# Patient Record
Sex: MALE | Employment: FULL TIME | URBAN - METROPOLITAN AREA
[De-identification: names, ages, dates, MRNs, and addresses within clinical notes are randomized per-mention and may not be internally consistent; named-entity substitution may affect disease eponyms.]

---

## 2018-01-12 ENCOUNTER — HOSPITAL ENCOUNTER (EMERGENCY)
Facility: CLINIC | Age: 28
Discharge: HOME | End: 2018-01-12
Attending: FAMILY MEDICINE | Admitting: FAMILY MEDICINE
Payer: OTHER MISCELLANEOUS

## 2018-01-12 VITALS
OXYGEN SATURATION: 98 % | TEMPERATURE: 98.6 F | HEIGHT: 72 IN | HEART RATE: 85 BPM | DIASTOLIC BLOOD PRESSURE: 98 MMHG | WEIGHT: 171.6 LBS | BODY MASS INDEX: 23.24 KG/M2 | SYSTOLIC BLOOD PRESSURE: 143 MMHG | RESPIRATION RATE: 16 BRPM

## 2018-01-12 DIAGNOSIS — S61.412A LACERATION OF LEFT HAND WITHOUT FOREIGN BODY, INITIAL ENCOUNTER: ICD-10-CM

## 2018-01-12 PROCEDURE — 12001 RPR S/N/AX/GEN/TRNK 2.5CM/<: CPT | Performed by: FAMILY MEDICINE

## 2018-01-12 PROCEDURE — 99282 EMERGENCY DEPT VISIT SF MDM: CPT | Mod: 25 | Performed by: FAMILY MEDICINE

## 2018-01-12 PROCEDURE — 90471 IMMUNIZATION ADMIN: CPT | Performed by: FAMILY MEDICINE

## 2018-01-12 PROCEDURE — 12001 RPR S/N/AX/GEN/TRNK 2.5CM/<: CPT | Mod: Z6 | Performed by: FAMILY MEDICINE

## 2018-01-12 PROCEDURE — 25000125 ZZHC RX 250: Performed by: FAMILY MEDICINE

## 2018-01-12 PROCEDURE — 90715 TDAP VACCINE 7 YRS/> IM: CPT | Performed by: FAMILY MEDICINE

## 2018-01-12 PROCEDURE — 25000128 H RX IP 250 OP 636: Performed by: FAMILY MEDICINE

## 2018-01-12 PROCEDURE — 99283 EMERGENCY DEPT VISIT LOW MDM: CPT | Mod: 25 | Performed by: FAMILY MEDICINE

## 2018-01-12 RX ORDER — BUPIVACAINE HYDROCHLORIDE 5 MG/ML
30 INJECTION, SOLUTION EPIDURAL; INTRACAUDAL ONCE
Status: COMPLETED | OUTPATIENT
Start: 2018-01-12 | End: 2018-01-12

## 2018-01-12 RX ORDER — IBUPROFEN 200 MG
600 TABLET ORAL EVERY 6 HOURS PRN
Qty: 30 TABLET | Refills: 0 | Status: SHIPPED | OUTPATIENT
Start: 2018-01-12 | End: 2018-09-07

## 2018-01-12 RX ADMIN — BUPIVACAINE HYDROCHLORIDE 150 MG: 5 INJECTION, SOLUTION EPIDURAL; INTRACAUDAL at 22:57

## 2018-01-12 RX ADMIN — CLOSTRIDIUM TETANI TOXOID ANTIGEN (FORMALDEHYDE INACTIVATED), CORYNEBACTERIUM DIPHTHERIAE TOXOID ANTIGEN (FORMALDEHYDE INACTIVATED), BORDETELLA PERTUSSIS TOXOID ANTIGEN (GLUTARALDEHYDE INACTIVATED), BORDETELLA PERTUSSIS FILAMENTOUS HEMAGGLUTININ ANTIGEN (FORMALDEHYDE INACTIVATED), BORDETELLA PERTUSSIS PERTACTIN ANTIGEN, AND BORDETELLA PERTUSSIS FIMBRIAE 2/3 ANTIGEN 0.5 ML: 5; 2; 2.5; 5; 3; 5 INJECTION, SUSPENSION INTRAMUSCULAR at 22:53

## 2018-01-12 NOTE — LETTER
[facility], [place], EMERGENCY DEPARTMENT  [address]  [facility] [address]  [phone]      2018    Cheko Guidry  [address]  [facility] [id number] [phone] (home)     : 1990      To Whom it may concern:    Cheko Guidry was seen in our Emergency Department today, 2018 for an injury that was reported to be non-work related.      For the next 1 days he should not work    The employee might be taking medication so that they cannot operate moving machinery or perform activities that require balancing or working above ground.    After returning to work the following restrictions apply for 3 days:   keep injury covered and dry and limit use of left hand for the next 3 days    The employee must keep the injured site clean and dry.    Sincerely,    Lucio Lange MD

## 2018-01-12 NOTE — ED AVS SNAPSHOT
[facility], Emergency Department    [address]    [place] [address]    Phone:  [phone]    Fax:  [phone]                                       Cheko Guidry   MRN: [id number]    Department:  [facility], Emergency Department   Date of Visit:  1/12/2018           Patient Information     Date Of Birth          1990        Your diagnoses for this visit were:     Laceration of left hand without foreign body, initial encounter        You were seen by Lucio Lange MD.        Discharge Instructions       Thank you for choosing [facility].     Please closely monitor for further symptoms. Return to the Emergency Department if you develop any new or worsening signs or symptoms.    If you received any opiate pain medications or sedatives during your visit, please do not drive for at least 8 hours.     Labs, cultures or final xray interpretations may still need to be reviewed.  We will call you if your plan of care needs to be changed.    Please follow up with your primary care physician or clinic 7-8 days for suture removal.      Extremity Laceration: Stitches, Staples, or Tape  A laceration is a cut through the skin. If it is deep, it may require stitches or staples to close so it can heal. Minor cuts may be treated with surgical tape closures, or skin glue.  X-rays may be done if something may have entered the skin through the cut. You may also need a tetanus shot if you are not up to date on this vaccination.  Home care    Follow the healthcare provider s instructions on how to care for the cut.    Wash your hands with soap and warm water before and after caring for your wound. This is to help prevent infection.    Keep the wound clean and dry. If a bandage was applied and it becomes wet or dirty, replace it. Otherwise, leave it in place for the first 24 hours, then change it once a day or as directed.    If stitches or staples were used, clean the wound  daily:    After removing the bandage, wash the area with soap and water. Use a wet cotton swab to loosen and remove any blood or crust that forms.    After cleaning, keep the wound clean and dry. Talk with your healthcare provider before applying any antibiotic ointment to the wound. Reapply the bandage.    You may remove the bandage to shower as usual after the first 24 hours, but don't soak the area in water (no swimming) until the stitches or staples are removed.    If surgical tape closures were used, keep the area clean and dry. If it becomes wet, blot it dry with a towel. Let the surgical tape fall off on its own.    The healthcare provider may prescribe an antibiotic cream or ointment to prevent infection. He or she may also prescribe an antibiotic pill. Don't stop taking this medicine until you have finished the prescribed course or the provider tells you to stop. The provider may also prescribe medicine for pain. Follow the instructions for taking these medicines.    Avoid activities that may reopen your wound.  Follow-up care  Follow up with your healthcare provider, or as advised. Most skin wounds heal within 10 days. However, an infection may sometimes occur despite proper treatment. Check the wound daily for the signs of infection listed below. Stitches and staples should be removed within 7 to14 days. If surgical tape closures were used, you may remove them after 10 days if they have not fallen off by then.   When to seek medical advice  Call your healthcare provider right away if any of these occur:    Wound bleeding not controlled by direct pressure    Signs of infection, including increasing pain in the wound, increasing wound redness or swelling, or pus or bad odor coming from the wound    Fever of 100.4 F (38 C) or higher or as directed by your healthcare provider    Stitches or staples come apart or fall out or surgical tape falls off before 7 days    Wound edges re-open    Wound changes  colors    Numbness occurs around the wound     Decreased movement around the injured area  Date Last Reviewed: 7/1/2017 2000-2017 [employer]. [address] [place] [address], [place] [address]. All rights reserved. This information is not intended as a substitute for professional medical care. Always follow your healthcare professional's instructions.          24 Hour Appointment Hotline       To make an appointment at any [facility] clinic, call [phone] ([phone]). If you don't have a family doctor or clinic, we will help you find one. [facility] clinics are conveniently located to serve the needs of you and your family.             Review of your medicines      START taking        Dose / Directions Last dose taken    ibuprofen 200 MG tablet   Commonly known as:  ADVIL/MOTRIN   Dose:  600 mg   Quantity:  30 tablet        Take 3 tablets (600 mg) by mouth every 6 hours as needed for mild pain   Refills:  0                Prescriptions were sent or printed at these locations (1 Prescription)                   Other Prescriptions                Printed at Department/Unit printer (1 of 1)         ibuprofen (ADVIL/MOTRIN) 200 MG tablet                Orders Needing Specimen Collection     None      Pending Results     No orders found from 1/10/2018 to 1/13/2018.            Pending Culture Results     No orders found from 1/10/2018 to 1/13/2018.            Pending Results Instructions     If you had any lab results that were not finalized at the time of your Discharge, you can call the ED Lab Result RN at [phone]. You will be contacted by this team for any positive Lab results or changes in treatment. The nurses are available 7 days a week from 10A to 6:30P.  You can leave a message 24 hours per day and they will return your call.        Thank you for choosing [facility]       Thank you for choosing [facility] for your care. Our goal is always to provide you with excellent care. Hearing back from our  "patients is one way we can continue to improve our services. Please take a few minutes to complete the written survey that you may receive in the mail after you visit with us. Thank you!        [facility] Information     [employer] lets you send messages to your doctor, view your test results, renew your prescriptions, schedule appointments and more. To sign up, go to www.[facility]"[employer].[facility]/[employer] . Click on \"Log in\" on the left side of the screen, which will take you to the Welcome page. Then click on \"Sign up Now\" on the right side of the page.     You will be asked to enter the access code listed below, as well as some personal information. Please follow the directions to create your username and password.     Your access code is: [email or web address]  Expires: 2018 11:13 PM     Your access code will  in 90 days. If you need help or a new code, please call your [facility] clinic or [phone].        Care EveryWhere ID     This is your Care EveryWhere ID. This could be used by other organizations to access your [facility] medical records  [id number]        Equal Access to Services     ANIL ANDERSON : Hadyennifer Negrete, pamela hernandez, ariel cano, roberto walters . So [facility] [phone].    ATENCIÓN: Si habla español, tiene a germain disposición servicios gratuitos de asistencia lingüística. Juli al [phone].    We comply with applicable federal civil rights laws and [place] laws. We do not discriminate on the basis of race, color, national origin, age, disability, sex, sexual orientation, or gender identity.            After Visit Summary       This is your record. Keep this with you and show to your community pharmacist(s) and doctor(s) at your next visit.                  "

## 2018-01-12 NOTE — ED AVS SNAPSHOT
[facility], [place], Emergency Department    [address]    [place] [address]    Phone:  [phone]    Fax:  [phone]                                       Cheko Guidry   MRN: [id number]    Department:  [facility], Emergency Department   Date of Visit:  1/12/2018           After Visit Summary Signature Page     I have received my discharge instructions, and my questions have been answered. I have discussed any challenges I see with this plan with the nurse or doctor.    ..........................................................................................................................................  Patient/Patient Representative Signature      ..........................................................................................................................................  Patient Representative Print Name and Relationship to Patient    ..................................................               ................................................  Date                                            Time    ..........................................................................................................................................  Reviewed by Signature/Title    ...................................................              ..............................................  Date                                                            Time

## 2018-01-13 NOTE — DISCHARGE INSTRUCTIONS
Thank you for choosing [facility].     Please closely monitor for further symptoms. Return to the Emergency Department if you develop any new or worsening signs or symptoms.    If you received any opiate pain medications or sedatives during your visit, please do not drive for at least 8 hours.     Labs, cultures or final xray interpretations may still need to be reviewed.  We will call you if your plan of care needs to be changed.    Please follow up with your primary care physician or clinic 7-8 days for suture removal.      Extremity Laceration: Stitches, Staples, or Tape  A laceration is a cut through the skin. If it is deep, it may require stitches or staples to close so it can heal. Minor cuts may be treated with surgical tape closures, or skin glue.  X-rays may be done if something may have entered the skin through the cut. You may also need a tetanus shot if you are not up to date on this vaccination.  Home care    Follow the healthcare provider s instructions on how to care for the cut.    Wash your hands with soap and warm water before and after caring for your wound. This is to help prevent infection.    Keep the wound clean and dry. If a bandage was applied and it becomes wet or dirty, replace it. Otherwise, leave it in place for the first 24 hours, then change it once a day or as directed.    If stitches or staples were used, clean the wound daily:    After removing the bandage, wash the area with soap and water. Use a wet cotton swab to loosen and remove any blood or crust that forms.    After cleaning, keep the wound clean and dry. Talk with your healthcare provider before applying any antibiotic ointment to the wound. Reapply the bandage.    You may remove the bandage to shower as usual after the first 24 hours, but don't soak the area in water (no swimming) until the stitches or staples are removed.    If surgical tape closures were used, keep the area clean and dry. If it  becomes wet, blot it dry with a towel. Let the surgical tape fall off on its own.    The healthcare provider may prescribe an antibiotic cream or ointment to prevent infection. He or she may also prescribe an antibiotic pill. Don't stop taking this medicine until you have finished the prescribed course or the provider tells you to stop. The provider may also prescribe medicine for pain. Follow the instructions for taking these medicines.    Avoid activities that may reopen your wound.  Follow-up care  Follow up with your healthcare provider, or as advised. Most skin wounds heal within 10 days. However, an infection may sometimes occur despite proper treatment. Check the wound daily for the signs of infection listed below. Stitches and staples should be removed within 7 to14 days. If surgical tape closures were used, you may remove them after 10 days if they have not fallen off by then.   When to seek medical advice  Call your healthcare provider right away if any of these occur:    Wound bleeding not controlled by direct pressure    Signs of infection, including increasing pain in the wound, increasing wound redness or swelling, or pus or bad odor coming from the wound    Fever of 100.4 F (38 C) or higher or as directed by your healthcare provider    Stitches or staples come apart or fall out or surgical tape falls off before 7 days    Wound edges re-open    Wound changes colors    Numbness occurs around the wound     Decreased movement around the injured area  Date Last Reviewed: 7/1/2017 2000-2017 [employer]. [address] [place] [address]. All rights reserved. This information is not intended as a substitute for professional medical care. Always follow your healthcare professional's instructions.

## 2018-01-13 NOTE — ED NOTES
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 Glass bottle shattered.

## 2018-01-13 NOTE — ED PROVIDER NOTES
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 Glass bottle shattered.      HPI  Cheko Guidry is a 27 year old right hand dominant male who presents with a cut to the palm of his left hand. He was opening a glass bottle of soda when the bottle shattered and cut his left hand. Last tetanus booster on [facility] was in 2005.  Denies any numbness or tingling.  No other injuries or complaints.    I have reviewed the Medications, Allergies, Past Medical and Surgical History, and Social History in the Epic system.    Review of Systems  Negative unless mentioned above  Physical Exam   BP: (!) 162/93  Heart Rate: 84  Temp: 97.3  F (36.3  C)  Resp: 16  Height: 182.9 cm (6')  Weight: 77.8 kg (171 lb 9.6 oz)  SpO2: 98 %      Physical Exam   Constitutional: He appears well-developed and well-nourished. No distress.   HENT:   Head: Normocephalic.   Eyes: Pupils are equal, round, and reactive to light.   Neck: Neck supple.   Cardiovascular: Normal rate and intact distal pulses.    Pulmonary/Chest: Effort normal.   Musculoskeletal: He exhibits tenderness.        Left hand: He exhibits laceration. He exhibits normal two-point discrimination and normal capillary refill. Normal sensation noted. Normal strength noted.        Hands:      ED Course     ED Course     Procedures       [facility] Procedure Note        Laceration Repair:    Performed by: Medical student Fuentes Hoyos and Lucio Lange MD  Authorized by: Lucio Lange  Consent given by: Patient who states understanding of the procedure being performed after discussing the risks, benefits and alternatives.    Preparation: Patient was prepped and draped in usual sterile fashion.  Irrigation solution: saline    Body area:left hand  Laceration length: 2.5cm  Contamination: The  wound is not contaminated.  Foreign bodies:none  Tendon involvement: none  Anesthesia: Local  Local anesthetic: Bupivacaine 0.5%  Anesthetic total: 2ml    Debridement: none  Skin closure: Closed with 5 x 4.0 Ethilon  Technique: interrupted  Approximation: close  Approximation difficulty: simple    Patient tolerance: Patient tolerated the procedure well with no immediate complications.         Critical Care time:  none             Labs Ordered and Resulted from Time of ED Arrival Up to the Time of Departure from the ED - No data to display         Assessments & Plan (with Medical Decision Making)   Otherwise healthy young man cut his hand on a glass bottle.  He is a bleeding laceration on the hyperthenar eminence of the left hand on the volar side.  There is no evidence of any retained foreign body, tendon injury, or neurovascular injury.  The laceration was repaired without difficulty, and his tetanus immunization status was updated.  He was given wound care instructions.  Discussed expected course, need for follow up, and indications for return with the patient.  See discharge instructions.      I have reviewed the nursing notes.    I have reviewed the findings, diagnosis, plan and need for follow up with the patient.    New Prescriptions    IBUPROFEN (ADVIL/MOTRIN) 200 MG TABLET    Take 3 tablets (600 mg) by mouth every 6 hours as needed for mild pain       Final diagnoses:   Laceration of left hand without foreign body, initial encounter       1/12/2018   [facility], [place], EMERGENCY DEPARTMENT     Lucio Lange MD  01/12/18 [id number]

## 2018-07-13 ENCOUNTER — OFFICE VISIT (OUTPATIENT)
Dept: UROLOGY | Facility: CLINIC | Age: 28
End: 2018-07-13
Payer: COMMERCIAL

## 2018-07-13 VITALS — HEART RATE: 97 BPM | SYSTOLIC BLOOD PRESSURE: 153 MMHG | OXYGEN SATURATION: 98 % | DIASTOLIC BLOOD PRESSURE: 107 MMHG

## 2018-07-13 DIAGNOSIS — Z30.09 VASECTOMY EVALUATION: Primary | ICD-10-CM

## 2018-07-13 DIAGNOSIS — R03.0 ELEVATED BLOOD PRESSURE READING WITHOUT DIAGNOSIS OF HYPERTENSION: ICD-10-CM

## 2018-07-13 DIAGNOSIS — Q55.4: ICD-10-CM

## 2018-07-13 PROCEDURE — 99203 OFFICE O/P NEW LOW 30 MIN: CPT | Performed by: UROLOGY

## 2018-07-13 NOTE — MR AVS SNAPSHOT
After Visit Summary   7/13/2018    Cheko Guidry    MRN: [id number]           Patient Information     Date Of Birth          1990        Visit Information        Provider Department      7/13/2018 10:00 AM Russell Munoz MD [facility]        Today's Diagnoses     Vasectomy evaluation    -  1    Elevated blood pressure reading without diagnosis of hypertension        Absent vasa          Care Instructions     Please complete your renal ultrasound Please call [phone] to schedule the renal ultrasound. It can be done at [facility], [facility], or [facility]           Your Vasectomy is scheduled 8/31/2018 @ 8:50am.  Please call [phone] if you need to reschedule this appointment or if you have any question.     Preparation for Vasectomy:  Shave the hair away from the base of your penis and around your testicles.  Wear snug underwear the day of the vasectomy to support your testicles.  Do not take aspirin, ibuprofen, advil, motrin, aleve products one week prior to your vasectomy.        General Vasectomy Information    Vasectomy is a surgery.  If it is successful, it will be impossible for you to ever father children.  The following information regards the male sterilization done by an operation called a vasectomy.  This is done in the physician's office.    The operation done to sterilize the male is easier, safer and much less expensive than the operation done to sterilize the woman.    Sterilization should be considered permanent.  For most males, once the operation is done, it can never me undone.  There are attempts made occasionally to reconnect the tubes that have been cut during the procedure, but this is very difficult and expensive and works only about 50-70% of the time.  In order for any of the physicians in our clinic to do a vasectomy, we require that you consider this a permanent form a sterilization.    A vasectomy can be done  at any time, but it is best to think about having it done when you can take at least one day off from work and any excess activities.    Your decision to have a vasectomy should only be made with the following facts clear in mind.    1. First, a vasectomy is only one of several means of birth control.  Many form of temporary contraception are available.  If you have any questions about other methods, please discuss this with your physician.    2. A vasectomy may be unsuccessful in approximately one out of 1000 couples per year.  This occurs when the tubes which are cut during the procedure reconnect themselves.  Sterility cannot be guaranteed.    3. You should be aware that it is the current belief of the medical profession that a vasectomy procedure does not alter a male physically, physiologically or sexually.  Because each person is a unique individual, there is always the possibility of an adverse phychiatric reaction.  This can be best avoided by being very comfortable in your own mind that you want to have this done, and that you do not want to father any children in the future.  If this is not clear in your mind, this should be further discussed with your physician.    4. You will not notice a change in the volume of your ejaculate since sperm is a very small amount of the semen and it is only the sperm that is stopped from entering the ejaculate after a vasectomy.  Your prostate and seminal vesicle glands really supply most of the semen and this is not at all decreased after a vasectomy.  Also there is no effect on the male hormones.    5. You do not become sterile immediately following a vasectomy due to the fact that there is still sperm remaining in your system that must be eliminated by ejaculation.  For this reason, your sexual partner could still become pregnant for a period of time following the vasectomy operation.  It is necessary that contraceptive measures be used until you receive confirmation  from your physician that you are sterile.  It takes approximately 12 ejaculations to clear the semen of sperm, but this can differ in different men.  For this reason, it is very important that your semen be checked for sperm before you are considered sterile, and this should be done approximately 12 weeks after your vasectomy.      6. Vasectomy has risks and benefits.  Among the risks are possible complications resulting from the procedure.  These risks include but are not limited to:   A.  Bleeding, infection, or hematoma occuring during or in the recovery period   from the procedure.   B.  Sperm granuloma or a small pea to walnut sized lump which is a collection of   scar tissue and sperm in your scrotal sack and remains permanently   C.  There may be an increased risk of prostate cancer although the data is   unclear.            Follow-ups after your visit        Your next 10 appointments already scheduled     Aug 31, 2018  9:00 AM CDT   Vasectomy with Russell Munoz MD, [facility] PROC ROOM   [facility] ([address] [id number] [phone]              Future tests that were ordered for you today     Open Future Orders        Priority Expected Expires Ordered    US Renal Complete Routine  8/27/2018 7/13/2018            Who to contact     If you have questions or need follow up information about today's clinic visit or your schedule please contact [facility] directly at [phone].  Normal or non-critical lab and imaging results will be communicated to you by MyChart, letter or phone within 4 business days after the clinic has received the results. If you do not hear from us within 7 days, please contact the clinic through MyChart or phone. If you have a critical or abnormal lab result, we will notify you by phone as soon as possible.  Submit refill requests through [employer] or call your pharmacy and they will forward the  refill request to us. Please allow 3 business days for your refill to be completed.          Additional Information About Your Visit        Care EveryWhere ID     This is your Care EveryWhere ID. This could be used by other organizations to access your [place] medical records  [id number]        Your Vitals Were     Pulse Pulse Oximetry                97 98%           Blood Pressure from Last 3 Encounters:   07/13/18 (!) 153/107   01/12/18 (!) 143/98    Weight from Last 3 Encounters:   01/12/18 77.8 kg (171 lb 9.6 oz)               Primary Care Provider Fax #    Physician No Ref-Primary [phone]       No address on file        Equal Access to Services     [facility]: Hadii luis Negrete, wananda hernandez, fletcheryblore kaalmada rachael, roberto walters . So [facility] [phone].    ATENCIÓN: Si habla español, tiene a germain disposición servicios gratuitos de asistencia lingüística. Llame al [phone].    We comply with applicable federal civil rights laws and [place] laws. We do not discriminate on the basis of race, color, national origin, age, disability, sex, sexual orientation, or gender identity.            Thank you!     Thank you for choosing [facility]  for your care. Our goal is always to provide you with excellent care. Hearing back from our patients is one way we can continue to improve our services. Please take a few minutes to complete the written survey that you may receive in the mail after your visit with us. Thank you!             Your Updated Medication List - Protect others around you: Learn how to safely use, store and throw away your medicines at www.disposemymeds.org.          This list is accurate as of 7/13/18 10:25 AM.  Always use your most recent med list.                   Brand Name Dispense Instructions for use Diagnosis    ibuprofen 200 MG tablet    ADVIL/MOTRIN    30 tablet    Take 3 tablets (600 mg) by mouth every 6 hours as needed for  mild pain

## 2018-07-13 NOTE — PATIENT INSTRUCTIONS
Please complete your renal ultrasound Please call [phone] to schedule the renal ultrasound. It can be done at [facility], [facility], or [facility]           Your Vasectomy is scheduled 8/31/2018 @ 8:50am.  Please call [phone] if you need to reschedule this appointment or if you have any question.     Preparation for Vasectomy:  Shave the hair away from the base of your penis and around your testicles.  Wear snug underwear the day of the vasectomy to support your testicles.  Do not take aspirin, ibuprofen, advil, motrin, aleve products one week prior to your vasectomy.        General Vasectomy Information    Vasectomy is a surgery.  If it is successful, it will be impossible for you to ever father children.  The following information regards the male sterilization done by an operation called a vasectomy.  This is done in the physician's office.    The operation done to sterilize the male is easier, safer and much less expensive than the operation done to sterilize the woman.    Sterilization should be considered permanent.  For most males, once the operation is done, it can never me undone.  There are attempts made occasionally to reconnect the tubes that have been cut during the procedure, but this is very difficult and expensive and works only about 50-70% of the time.  In order for any of the physicians in our clinic to do a vasectomy, we require that you consider this a permanent form a sterilization.    A vasectomy can be done at any time, but it is best to think about having it done when you can take at least one day off from work and any excess activities.    Your decision to have a vasectomy should only be made with the following facts clear in mind.    1. First, a vasectomy is only one of several means of birth control.  Many form of temporary contraception are available.  If you have any questions about other methods, please discuss this with your  physician.    2. A vasectomy may be unsuccessful in approximately one out of 1000 couples per year.  This occurs when the tubes which are cut during the procedure reconnect themselves.  Sterility cannot be guaranteed.    3. You should be aware that it is the current belief of the medical profession that a vasectomy procedure does not alter a male physically, physiologically or sexually.  Because each person is a unique individual, there is always the possibility of an adverse phychiatric reaction.  This can be best avoided by being very comfortable in your own mind that you want to have this done, and that you do not want to father any children in the future.  If this is not clear in your mind, this should be further discussed with your physician.    4. You will not notice a change in the volume of your ejaculate since sperm is a very small amount of the semen and it is only the sperm that is stopped from entering the ejaculate after a vasectomy.  Your prostate and seminal vesicle glands really supply most of the semen and this is not at all decreased after a vasectomy.  Also there is no effect on the male hormones.    5. You do not become sterile immediately following a vasectomy due to the fact that there is still sperm remaining in your system that must be eliminated by ejaculation.  For this reason, your sexual partner could still become pregnant for a period of time following the vasectomy operation.  It is necessary that contraceptive measures be used until you receive confirmation from your physician that you are sterile.  It takes approximately 12 ejaculations to clear the semen of sperm, but this can differ in different men.  For this reason, it is very important that your semen be checked for sperm before you are considered sterile, and this should be done approximately 12 weeks after your vasectomy.      6. Vasectomy has risks and benefits.  Among the risks are possible complications resulting from the  procedure.  These risks include but are not limited to:   A.  Bleeding, infection, or hematoma occuring during or in the recovery period   from the procedure.   B.  Sperm granuloma or a small pea to walnut sized lump which is a collection of   scar tissue and sperm in your scrotal sack and remains permanently   C.  There may be an increased risk of prostate cancer although the data is   unclear.

## 2018-07-13 NOTE — PROGRESS NOTES
Vasectomy Consult    Reason for visit:   Discuss as a method of birth control/sterilization.  He is interested in vasectomy scrotally.  Patient has 3 children and desires sterilization.  Does not want to use condom or having partners on birth control pills.  He has no erections problem.  He has no urinary complaints.    Current Outpatient Prescriptions   Medication Sig Dispense Refill     ibuprofen (ADVIL/MOTRIN) 200 MG tablet Take 3 tablets (600 mg) by mouth every 6 hours as needed for mild pain 30 tablet 0     No Known Allergies  No past medical history on file.  No past surgical history on file.   No family history on file.  Social History     Social History     Marital status:      Spouse name: N/A     Number of children: N/A     Years of education: N/A     Social History Main Topics     Smoking status: Current Every Day Smoker     Packs/day: 0.25     Smokeless tobacco: Never Used     Alcohol use Yes      Comment: Occassionally.      Drug use: No     Sexual activity: Not Asked     Other Topics Concern     None     Social History Narrative       REVIEW OF SYSTEMS  =================  C: NEGATIVE for fever, chills, change in weight  I: NEGATIVE for worrisome rashes, moles or lesions  E/M: NEGATIVE for ear, mouth and throat problems  R: NEGATIVE for significant cough or SHORTNESS OF BREATH  CV:  NEGATIVE for chest pain, palpitations or peripheral edema  GI: NEGATIVE for nausea, abdominal pain, heartburn, or change in bowel habits  NEURO: NEGATIVE numbness/weakness  : see HPI  PSYCH: NEGATIVE depression/anxiety  LYmph: no new enlarged lymph nodes  Ortho: no new trauma/movements      Physical Exam:  BP (!) 153/107 (BP Location: Right arm, Patient Position: Chair, Cuff Size: Adult Regular)  Pulse 97  SpO2 98%   Patient is pleasant, in no acute distress, good general condition.  HEENT:  Normalcephalic, atraumatic  Lung: no evidence of respiratory distress    Abdomen: Soft, nondistended, non tender. No masses.  No rebound or guarding.   Exam: penis no d/c testis no masses right vas palpated but left vas was not well palpated.  Small tubular stricture felt but unsure if it was vas deferen. no scrotal lesion  Skin: Warm and dry.  No redness.  Neuro: grossly normal  Psych normal mood and affect  Musculoskeletal  moving all extremities        Discussed    That vasectomy is permanent method of birth control.    That vasectomy can fail due to recanalization of the vas even many months/years later.    That he needs 2 negative sperm checks to be considered sterile    That there are other methods that are not permanent and also that the sperm can be frozen for later use.    How the technique is performed, risks of infection, bleeding, damage to the testes vessels and testes atrophy    Long term complication such as chronic and difficult to treat testes pain and questionable increase incidence of prostate cancer    That the procedure can be done at the clinic or hospital OR        Plan:    Stop Aspirin  Will schedule Vasectomy in the future but needs renal ultrasound first to r/o congenital absence of vas deferen      Elevated bp: Patient to follow up with Primary Care provider regarding elevated blood pressure.

## 2018-07-18 ENCOUNTER — RADIANT APPOINTMENT (OUTPATIENT)
Dept: ULTRASOUND IMAGING | Facility: CLINIC | Age: 28
End: 2018-07-18
Attending: UROLOGY
Payer: COMMERCIAL

## 2018-07-18 DIAGNOSIS — Q55.4: ICD-10-CM

## 2018-07-18 PROCEDURE — 76770 US EXAM ABDO BACK WALL COMP: CPT

## 2018-08-03 ENCOUNTER — TELEPHONE (OUTPATIENT)
Dept: UROLOGY | Facility: CLINIC | Age: 28
End: 2018-08-03

## 2018-08-03 NOTE — TELEPHONE ENCOUNTER
Per renal ultrasound result note R/S vas to surgery center.Left message for patient to call. Need to cancel in clinic vas. Per Dr. Munoz, not able to palpitate the vas so best to do the procedure under sedation. Route message to MD after speaking to patient so he can enter orders.  Lucy Ennis, CMA

## 2018-08-09 ENCOUNTER — TELEPHONE (OUTPATIENT)
Dept: UROLOGY | Facility: CLINIC | Age: 28
End: 2018-08-09

## 2018-08-09 DIAGNOSIS — Z30.09 VASECTOMY EVALUATION: Primary | ICD-10-CM

## 2018-08-09 NOTE — TELEPHONE ENCOUNTER
Spoke to patient agrees to complete vasectomy at the surgical center.   Routed message to provider to place orders.   She [place] RN

## 2018-08-29 NOTE — PATIENT INSTRUCTIONS
Before Your Surgery      Call your surgeon if there is any change in your health. This includes signs of a cold or flu (such as a sore throat, runny nose, cough, rash or fever).    Do not smoke, drink alcohol or take over the counter medicine (unless your surgeon or primary care doctor tells you to) for the 24 hours before and after surgery.    If you take prescribed drugs: Follow your doctor s orders about which medicines to take and which to stop until after surgery.    Eating and drinking prior to surgery: follow the instructions from your surgeon    Take a shower or bath the night before surgery. Use the soap your surgeon gave you to gently clean your skin. If you do not have soap from your surgeon, use your regular soap. Do not shave or scrub the surgery site.  Wear clean pajamas and have clean sheets on your bed.                                      Dietary Approaches to Stop Hypertension (The DASH Diet)   What is hypertension?   Hypertension is blood pressure that keeps being higher than normal. Blood pressure is the force of blood against artery walls as the heart pumps blood through the body. Blood pressure can be unhealthy if it is above 120/80. The higher your blood pressure, the greater the health risk.   High blood pressure can be controlled if you take these steps:   Maintain a healthy weight.   Are physically active.   Follow a healthy eating plan, which includes foods that do not have a lot of salt and sodium.   Do not drink a lot of alcohol.   Diet affects high blood pressure. Following the DASH diet and reducing the amount of sodium in your diet will help lower your blood pressure. It will also help prevent high blood pressure.   What is the DASH diet?   Dietary Approaches to Stop Hypertension (DASH) is a diet that is low in saturated fat, cholesterol, and total fat. It emphasizes fruits, vegetables, and low-fat dairy foods. The DASH diet also includes whole-grain products, fish, poultry, and  nuts. It encourages fewer servings of red meat, sweets, and sugar-containing beverages. It is rich in magnesium, potassium, and calcium, as well as protein and fiber.   How do I get started on the DASH diet?   The DASH diet requires no special foods and has no hard-to-follow recipes. Start by seeing how DASH compares with your current eating habits.   The DASH eating plan illustrated below is based on a diet of 2,000 calories a day. Your healthcare provider or a dietitian can help you determine how many calories a day you need. Most adults need somewhere between 1600 and 2800 calories a day. Serving sizes for different foods vary from 1/2 cup to 1 and 1/4 cups. Check product nutrition labels for serving sizes and the number of calories per serving.                      Number of        Examples of  Food Group      servings         serving size  ----------------------------------------------------------------    Grains and      7 to 8 per day   1 slice of bread  grain products                   1 cup ready-to-eat cold cereal                                   1/2 cup cooked rice, pasta,                                   or cereal    Vegetables      4 to 5 per day   1 cup raw leafy vegetable                                   1/2 cup cooked vegetable                                   6 ounces (oz) vegetable juice      Fruits          4 to 5 per day   1 medium fruit                                   1/4 cup dried fruit                                   1/2 cup fresh, frozen, or                                   canned fruit                                   6 oz fruit juice      Low-fat or      2 to 3 per day   8 oz milk  fat-free                         1 cup yogurt  dairy foods                      1 and 1/2 oz cheese    Lean meats,  poultry,        2 or fewer per   3 oz cooked lean meat,  or fish         day              skinless poultry, or fish    Nuts, seeds,    4 to 5 per week  1/3 cup or 1 and 1/2 oz nuts  and  dry beans                    1 tablespoon or 1/2 oz seeds                                   1/2 cup cooked dry beans    Fats and oils   2 to 3 per day   1 teaspoon soft margarine                                   1 tablespoon low-fat mayonnaise                                   2 tablespoons light salad                                   dressing                                   1 teaspoon vegetable oil    Sweets          5 per week       1 tablespoon sugar                                   1 tablespoon jelly or jam                                   1/2 oz jelly beans                                   8 oz lemonade  ----------------------------------------------------------------  Make changes gradually. Here are some suggestions that might help:   If you now eat 1 or 2 servings of vegetables a day, add a serving at lunch and another at dinner.   If you have not been eating fruit regularly, or have only juice at breakfast, add a serving to your meals or have it as a snack.   Drink milk or water with lunch or dinner instead of soda, sugar-sweetened tea, or alcohol. Choose low-fat (1%) or fat-free (nonfat) dairy products so that you are eating fewer calories and less saturated fat, total fat, and cholesterol.   Read food labels on margarines and salad dressings to choose products lowest in fat.   If you now eat large portions of meat, slowly cut back--by a half or a third at each meal. Limit meat to 6 ounces a day (two 3-ounce servings). Three to 4 ounces is about the size of a deck of cards.   Have 2 or more meatless meals each week. Increase servings of vegetables, rice, pasta, and beans in all meals. Try casseroles, pasta, and stir-rasmussen dishes, which have less meat and more vegetables, grains, and beans.   Use fruits canned in their own juice. Fresh fruits require little or no preparation. Dried fruits are a good choice to carry with you or to have ready in the car.   Try these snacks ideas: unsalted pretzels or  nuts mixed with raisins, hesham crackers, low-fat and fat-free yogurt or frozen yogurt, popcorn with no salt or butter added, and raw vegetables.   Choose whole-grain foods to get more nutrients, including minerals and fiber. For example, choose whole-wheat bread, whole-grain cereals, or brown rice.   Use fresh, frozen, or no-salt-added canned vegetables.   Remember to also reduce the salt and sodium in your diet. Try to have no more than 2000 milligrams (mg) of sodium per day, with a goal of further reducing the sodium to 1500 mg per day. Three important ways to reduce sodium are:   Eat food products with reduced-sodium or no salt added.   Use less salt when you prepare foods and do not add salt to your food at the table.   Read food labels. Aim for foods that contain less than 5% of the daily value of sodium.   The DASH eating plan is not designed for weight loss. But it contains many lower-calorie foods, such as fruits and vegetables. You can make it lower in calories by replacing high-calorie foods with more fruits and vegetables. Some ideas to increase fruits and vegetables and decrease calories include:   Eat a medium apple instead of 4 shortbread cookies. You'll save 80 calories.   Eat 1/4 cup of dried apricots instead of a 2-ounce bag of pork rinds. You'll save 230 calories.   Have a hamburger that's 3 ounces instead of 6 ounces. Add a 1/2 cup serving of carrots and a 1/2 cup serving of spinach. You'll save more than 200 calories.   Instead of 5 ounces of chicken, have a stir rasmussen with 2 ounces of chicken and 1 and 1/2 cups of raw vegetables. Use just a small amount of vegetable oil. You'll save 50 calories.   Have a 1/2 cup serving of low-fat frozen yogurt instead of a 1-and-1/2-ounce chocolate bar. You'll save about 110 calories.   Use low-fat or fat-free condiments, such as fat-free salad dressings.   Eat smaller portions. Cut back gradually.   Use food labels to compare fat and calorie content in packaged  foods. Items marked low fat or fat free may be lower in fat but not lower in calories than their regular versions.   Limit foods with lots of added sugar, such as pies, flavored yogurts, candy bars, ice cream, sherbet, regular soft drinks, and fruit drinks.   Drink water or club soda instead of cola or other soda drinks.     For more information, see the National Heart, Lung, and Blood [place] Web site at: http://www.nhlbi.nih.gov/health/public/heart/hbp/dash/.   [facility]    If you have any questions regarding to your visit please contact your care team:       Team Red:   Clinic Hours Telephone Number   Dr. Krystin Porter, NP 7am-7pm  Monday - Thursday   7am-5pm  Fridays  [phone]  (Appointment scheduling available 24/7)   Urgent [facility] - [place] - 11am-9pm Monday-Friday Saturday-Sunday- 9am-5pm   [place] - 5pm-9pm Monday-Friday Saturday-Sunday- 9am-5pm  [phone] - [place]  [phone] - [place]       What options do I have for a visit other than an office visit? We offer electronic visits (e-visits) and telephone visits, when medically appropriate.  Please check with your medical insurance to see if these types of visits are covered, as you will be responsible for any charges that are not paid by your insurance.      You can use Sc[employer] 5 (secure electronic communication) to access to your chart, send your primary care provider a message, or make an appointment. Ask a team member how to get started.     For a price quote for your services, please call our Consumer Price Line at [phone] or our Imaging Cost estimation line at [phone] (for imaging tests).    Discharged by Lucy GREGORY CMA ([facility])

## 2018-08-29 NOTE — PROGRESS NOTES
[facility]  [address] [place] [id number]  [phone]  Dept: [phone]    PRE-OP EVALUATION:  Today's date: 2018    Cheko Guidry (: 1990) presents for pre-operative evaluation assessment as requested by Dr. Munoz.  He requires evaluation and anesthesia risk assessment prior to undergoing surgery/procedure for treatment of Vasectomy.    Proposed Surgery/ Procedure: Vasectomy  Date of Surgery/ Procedure: 2018  Time of Surgery/ Procedure: [facility]  [place]ospital/Surgical [place] [facility] [place]  Primary Physician: No Ref-Primary, Physician  Type of Anesthesia Anticipated: to be determined    Patient has a Health Care Directive or Living Will:  NO    1. NO - Do you have a history of heart attack, stroke, stent, bypass or surgery on an artery in the head, neck, heart or legs?  2. NO - Do you ever have any pain or discomfort in your chest?  3. NO - Do you have a history of  Heart Failure?  4. NO - Are you troubled by shortness of breath when: walking on the level, up a slight hill or at night?  5. NO - Do you currently have a cold, bronchitis or other respiratory infection?  6. NO - Do you have a cough, shortness of breath or wheezing?  7. NO - Do you sometimes get pains in the calves of your legs when you walk?  8. NO - Do you or anyone in your family have previous history of blood clots?  9. NO - Do you or does anyone in your family have a serious bleeding problem such as prolonged bleeding following surgeries or cuts?  10. NO - Have you ever had problems with anemia or been told to take iron pills?  11. NO - Have you had any abnormal blood loss such as black, tarry or bloody stools, or abnormal vaginal bleeding?  12. NO - Have you ever had a blood transfusion?  13. NO - Have you or any of your relatives ever had problems with anesthesia?  14. NO - Do you have sleep apnea, excessive snoring or daytime drowsiness?  15. NO - Do you have any prosthetic heart valves?  16. NO - Do you have  prosthetic joints?  17. NO - Is there any chance that you may be pregnant?      HPI:     HPI related to upcoming procedure: Patient  with 3 children and desires vasectomy for contraception.      See problem list for active medical problems.  Problems all longstanding and stable, except as noted/documented.  See ROS for pertinent symptoms related to these conditions.                                                                                                                                                          .    MEDICAL HISTORY:   There are no active problems to display for this patient.     No past medical history on file.  No past surgical history on file.  No current outpatient prescriptions on file.     OTC products: None, except as noted above    No Known Allergies   Latex Allergy: NO    Social History   Substance Use Topics     Smoking status: Current Every Day Smoker     Packs/day: 0.25     Smokeless tobacco: Never Used     Alcohol use Yes      Comment: Occassionally.      History   Drug Use No       REVIEW OF SYSTEMS:   Constitutional, neuro, ENT, endocrine, pulmonary, cardiac, gastrointestinal, genitourinary, musculoskeletal, integument and psychiatric systems are negative, except as otherwise noted.    EXAM:   /82  Pulse 87  Temp 97.2  F (36.2  C) (Oral)  Resp 16  Ht 6' (1.829 m)  Wt 166 lb (75.3 kg)  SpO2 99%  BMI 22.51 kg/m2    GENERAL APPEARANCE: healthy, alert and no distress     EYES: EOMI,  PERRL     HENT: ear canals and TM's normal and nose and mouth without ulcers or lesions     NECK: no adenopathy, no asymmetry, masses, or scars and thyroid normal to palpation     RESP: lungs clear to auscultation - no rales, rhonchi or wheezes     CV: regular rates and rhythm, normal S1 S2, no S3 or S4 and no murmur, click or rub     ABDOMEN:  soft, nontender, no HSM or masses and bowel sounds normal     MS: extremities normal- no gross deformities noted, no evidence of inflammation  in joints, FROM in all extremities.     SKIN: no suspicious lesions or rashes     NEURO: Normal strength and tone, sensory exam grossly normal, mentation intact and speech normal     PSYCH: mentation appears normal. and affect normal/bright     LYMPHATICS: No cervical adenopathy    DIAGNOSTICS:   EKG: Not indicated due to non-vascular surgery and low risk of event (age <65 and without cardiac risk factors)    No results for input(s): HGB, PLT, INR, NA, POTASSIUM, CR, A1C in the last 43238 hours.     IMPRESSION:   Reason for surgery/procedure: Sterilization  Diagnosis/reason for consult: Evaluate perioperative risk    The proposed surgical procedure is considered LOW risk.    REVISED CARDIAC RISK INDEX  The patient has the following serious cardiovascular risks for perioperative complications such as (MI, PE, VFib and 3  AV Block):  No serious cardiac risks  INTERPRETATION: 0 risks: Class I (very low risk - 0.4% complication rate)    The patient has the following additional risks for perioperative complications:  Tobacco abuse  Elevated blood pressures      ICD-10-CM    1. Preop general physical exam Z01.818    2. Encounter for sterilization Z30.2    3. Elevated blood pressure reading without diagnosis of hypertension R03.0    4. Tobacco abuse Z72.0    5. Need for prophylactic vaccination and inoculation against influenza Z23 FLU VACCINE, SPLIT VIRUS, IM (QUADRIVALENT) [15974]- >3 YRS     Vaccine Administration, Initial [53752]       RECOMMENDATIONS:       Has had multiple elevated BPs in the past and high today. He is drinking a Monster during our visit and typically has 2 cups of coffee and 1 monster per day. Recommend he stop all caffeine, quit smoking, and follow up in 1 month for BP check. Diet/exercise counseling and salt restriction recommended also.    Pulmonary Risk  Advised smoking cessation.       --Patient is to take all scheduled medications on the day of surgery EXCEPT for modifications listed  below.  -Avoid over the counter NSAIDs and Aspirin    APPROVAL GIVEN to proceed with proposed procedure, without further diagnostic evaluation       Signed Electronically by: FRANKLIN Boss CNP    Copy of this evaluation report is provided to requesting physician.    [facility] Preop Guidelines    Revised Cardiac Risk Index    Injectable Influenza Immunization Documentation    1.  Is the person to be vaccinated sick today?   No    2. Does the person to be vaccinated have an allergy to a component   of the vaccine?   No  Egg Allergy Algorithm Link    3. Has the person to be vaccinated ever had a serious reaction   to influenza vaccine in the past?   No    4. Has the person to be vaccinated ever had Guillain-Barré syndrome?   No    Form completed by Karyn Cheatham MA

## 2018-09-07 ENCOUNTER — OFFICE VISIT (OUTPATIENT)
Dept: FAMILY MEDICINE | Facility: CLINIC | Age: 28
End: 2018-09-07
Payer: COMMERCIAL

## 2018-09-07 VITALS
RESPIRATION RATE: 16 BRPM | DIASTOLIC BLOOD PRESSURE: 82 MMHG | HEART RATE: 87 BPM | SYSTOLIC BLOOD PRESSURE: 140 MMHG | TEMPERATURE: 97.2 F | WEIGHT: 166 LBS | OXYGEN SATURATION: 99 % | HEIGHT: 72 IN | BODY MASS INDEX: 22.48 KG/M2

## 2018-09-07 DIAGNOSIS — Z30.2 ENCOUNTER FOR STERILIZATION: ICD-10-CM

## 2018-09-07 DIAGNOSIS — Z72.0 TOBACCO ABUSE: ICD-10-CM

## 2018-09-07 DIAGNOSIS — Z23 NEED FOR PROPHYLACTIC VACCINATION AND INOCULATION AGAINST INFLUENZA: ICD-10-CM

## 2018-09-07 DIAGNOSIS — Z01.818 PREOP GENERAL PHYSICAL EXAM: Primary | ICD-10-CM

## 2018-09-07 DIAGNOSIS — R03.0 ELEVATED BLOOD PRESSURE READING WITHOUT DIAGNOSIS OF HYPERTENSION: ICD-10-CM

## 2018-09-07 PROCEDURE — 99204 OFFICE O/P NEW MOD 45 MIN: CPT | Performed by: NURSE PRACTITIONER

## 2018-09-07 ASSESSMENT — PAIN SCALES - GENERAL: PAINLEVEL: NO PAIN (0)

## 2018-09-07 NOTE — Clinical Note
Please fax pre-op note from 09/07/18 to surgical facility listed in pre-op note.  Sherri oPrter, CNP

## 2018-09-07 NOTE — MR AVS SNAPSHOT
After Visit Summary   9/7/2018    Cheko Guidry    MRN: [id number]           Patient Information     Date Of Birth          1990        Visit Information        Provider Department      9/7/2018 1:20 PM Sherri Porter APRN [facility]        Today's Diagnoses     Preop general physical exam    -  1    Encounter for sterilization        Elevated blood pressure reading without diagnosis of hypertension        Tobacco abuse        Need for prophylactic vaccination and inoculation against influenza          Care Instructions      Before Your Surgery      Call your surgeon if there is any change in your health. This includes signs of a cold or flu (such as a sore throat, runny nose, cough, rash or fever).    Do not smoke, drink alcohol or take over the counter medicine (unless your surgeon or primary care doctor tells you to) for the 24 hours before and after surgery.    If you take prescribed drugs: Follow your doctor s orders about which medicines to take and which to stop until after surgery.    Eating and drinking prior to surgery: follow the instructions from your surgeon    Take a shower or bath the night before surgery. Use the soap your surgeon gave you to gently clean your skin. If you do not have soap from your surgeon, use your regular soap. Do not shave or scrub the surgery site.  Wear clean pajamas and have clean sheets on your bed.                                      Dietary Approaches to Stop Hypertension (The DASH Diet)   What is hypertension?   Hypertension is blood pressure that keeps being higher than normal. Blood pressure is the force of blood against artery walls as the heart pumps blood through the body. Blood pressure can be unhealthy if it is above 120/80. The higher your blood pressure, the greater the health risk.   High blood pressure can be controlled if you take these steps:   Maintain a healthy weight.   Are physically active.   Follow a healthy  eating plan, which includes foods that do not have a lot of salt and sodium.   Do not drink a lot of alcohol.   Diet affects high blood pressure. Following the DASH diet and reducing the amount of sodium in your diet will help lower your blood pressure. It will also help prevent high blood pressure.   What is the DASH diet?   Dietary Approaches to Stop Hypertension (DASH) is a diet that is low in saturated fat, cholesterol, and total fat. It emphasizes fruits, vegetables, and low-fat dairy foods. The DASH diet also includes whole-grain products, fish, poultry, and nuts. It encourages fewer servings of red meat, sweets, and sugar-containing beverages. It is rich in magnesium, potassium, and calcium, as well as protein and fiber.   How do I get started on the DASH diet?   The DASH diet requires no special foods and has no hard-to-follow recipes. Start by seeing how DASH compares with your current eating habits.   The DASH eating plan illustrated below is based on a diet of 2,000 calories a day. Your healthcare provider or a dietitian can help you determine how many calories a day you need. Most adults need somewhere between 1600 and 2800 calories a day. Serving sizes for different foods vary from 1/2 cup to 1 and 1/4 cups. Check product nutrition labels for serving sizes and the number of calories per serving.                      Number of        Examples of  Food Group      servings         serving size  ----------------------------------------------------------------    Grains and      7 to 8 per day   1 slice of bread  grain products                   1 cup ready-to-eat cold cereal                                   1/2 cup cooked rice, pasta,                                   or cereal    Vegetables      4 to 5 per day   1 cup raw leafy vegetable                                   1/2 cup cooked vegetable                                   6 ounces (oz) vegetable juice      Fruits          4 to 5 per day   1 medium  fruit                                   1/4 cup dried fruit                                   1/2 cup fresh, frozen, or                                   canned fruit                                   6 oz fruit juice      Low-fat or      2 to 3 per day   8 oz milk  fat-free                         1 cup yogurt  dairy foods                      1 and 1/2 oz cheese    Lean meats,  poultry,        2 or fewer per   3 oz cooked lean meat,  or fish         day              skinless poultry, or fish    Nuts, seeds,    4 to 5 per week  1/3 cup or 1 and 1/2 oz nuts  and dry beans                    1 tablespoon or 1/2 oz seeds                                   1/2 cup cooked dry beans    Fats and oils   2 to 3 per day   1 teaspoon soft margarine                                   1 tablespoon low-fat mayonnaise                                   2 tablespoons light salad                                   dressing                                   1 teaspoon vegetable oil    Sweets          5 per week       1 tablespoon sugar                                   1 tablespoon jelly or jam                                   1/2 oz jelly beans                                   8 oz lemonade  ----------------------------------------------------------------  Make changes gradually. Here are some suggestions that might help:   If you now eat 1 or 2 servings of vegetables a day, add a serving at lunch and another at dinner.   If you have not been eating fruit regularly, or have only juice at breakfast, add a serving to your meals or have it as a snack.   Drink milk or water with lunch or dinner instead of soda, sugar-sweetened tea, or alcohol. Choose low-fat (1%) or fat-free (nonfat) dairy products so that you are eating fewer calories and less saturated fat, total fat, and cholesterol.   Read food labels on margarines and salad dressings to choose products lowest in fat.   If you now eat large portions of meat, slowly cut back--by a  half or a third at each meal. Limit meat to 6 ounces a day (two 3-ounce servings). Three to 4 ounces is about the size of a deck of cards.   Have 2 or more meatless meals each week. Increase servings of vegetables, rice, pasta, and beans in all meals. Try casseroles, pasta, and stir-rasmussen dishes, which have less meat and more vegetables, grains, and beans.   Use fruits canned in their own juice. Fresh fruits require little or no preparation. Dried fruits are a good choice to carry with you or to have ready in the car.   Try these snacks ideas: unsalted pretzels or nuts mixed with raisins, hesham crackers, low-fat and fat-free yogurt or frozen yogurt, popcorn with no salt or butter added, and raw vegetables.   Choose whole-grain foods to get more nutrients, including minerals and fiber. For example, choose whole-wheat bread, whole-grain cereals, or brown rice.   Use fresh, frozen, or no-salt-added canned vegetables.   Remember to also reduce the salt and sodium in your diet. Try to have no more than 2000 milligrams (mg) of sodium per day, with a goal of further reducing the sodium to 1500 mg per day. Three important ways to reduce sodium are:   Eat food products with reduced-sodium or no salt added.   Use less salt when you prepare foods and do not add salt to your food at the table.   Read food labels. Aim for foods that contain less than 5% of the daily value of sodium.   The DASH eating plan is not designed for weight loss. But it contains many lower-calorie foods, such as fruits and vegetables. You can make it lower in calories by replacing high-calorie foods with more fruits and vegetables. Some ideas to increase fruits and vegetables and decrease calories include:   Eat a medium apple instead of 4 shortbread cookies. You'll save 80 calories.   Eat 1/4 cup of dried apricots instead of a 2-ounce bag of pork rinds. You'll save 230 calories.   Have a hamburger that's 3 ounces instead of 6 ounces. Add a 1/2 cup  serving of carrots and a 1/2 cup serving of spinach. You'll save more than 200 calories.   Instead of 5 ounces of chicken, have a stir rasmussen with 2 ounces of chicken and 1 and 1/2 cups of raw vegetables. Use just a small amount of vegetable oil. You'll save 50 calories.   Have a 1/2 cup serving of low-fat frozen yogurt instead of a 1-and-1/2-ounce chocolate bar. You'll save about 110 calories.   Use low-fat or fat-free condiments, such as fat-free salad dressings.   Eat smaller portions. Cut back gradually.   Use food labels to compare fat and calorie content in packaged foods. Items marked low fat or fat free may be lower in fat but not lower in calories than their regular versions.   Limit foods with lots of added sugar, such as pies, flavored yogurts, candy bars, ice cream, sherbet, regular soft drinks, and fruit drinks.   Drink water or club soda instead of cola or other soda drinks.     For more information, see the National Heart, Lung, and Blood [place] Web site at: http://www.nhlbi.nih.gov/health/public/heart/hbp/dash/.   [facility]    If you have any questions regarding to your visit please contact your care team:       Team Red:   Clinic Hours Telephone Number   Dr. Krystin Porter, NP 7am-7pm  Monday - Thursday   7am-5pm  Fridays  [phone]  (Appointment scheduling available 24/7)   Urgent Care - [place] and [place] [facility] - 11am-9pm Monday-Friday Saturday-Sunday- 9am-5pm   [place] - 5pm-9pm Monday-Friday Saturday-Sunday- 9am-5pm  [phone] - [facility]  [phone] - [place]       What options do I have for a visit other than an office visit? We offer electronic visits (e-visits) and telephone visits, when medically appropriate.  Please check with your medical insurance to see if these types of visits are covered, as you will be responsible for any charges that are not paid by your insurance.      You can use [employer]  (secure electronic communication) to access to your chart, send your primary care provider a message, or make an appointment. Ask a team member how to get started.     For a price quote for your services, please call our Consumer Price Line at [phone] or our Imaging Cost estimation line at [phone] (for imaging tests).    Discharged by Lucy GREGORY CMA ([facility])            Follow-ups after your visit        Follow-up notes from your care team     Return in about 4 weeks (around 10/5/2018) for Nurse only BP check.      Your next 10 appointments already scheduled     Sep 17, 2018   Procedure with Russell Munoz MD   [facility] (--)    [address] [place]  [facility] [address] [phone]              Who to contact     If you have questions or need follow up information about today's clinic visit or your schedule please contact [facility] directly at [phone].  Normal or non-critical lab and imaging results will be communicated to you by MyChart, letter or phone within 4 business days after the clinic has received the results. If you do not hear from us within 7 days, please contact the clinic through [employer] or phone. If you have a critical or abnormal lab result, we will notify you by phone as soon as possible.  Submit refill requests through [facility] or call your pharmacy and they will forward the refill request to us. Please allow 3 business days for your refill to be completed.          Additional Information About Your Visit        Care EveryWhere ID     This is your Care EveryWhere ID. This could be used by other organizations to access your [facility] medical records  [id number]        Your Vitals Were     Pulse Temperature Respirations Height Pulse Oximetry BMI (Body Mass Index)    87 97.2  F (36.2  C) (Oral) 16 6' (1.829 m) 99% 22.51 kg/m2       Blood Pressure from Last 3 Encounters:   09/07/18 140/82   07/13/18 (!) 153/107   01/12/18 (!) 143/98    Weight  from Last 3 Encounters:   09/07/18 166 lb (75.3 kg)   01/12/18 171 lb 9.6 oz (77.8 kg)              We Performed the Following     FLU VACCINE, SPLIT VIRUS, IM (QUADRIVALENT) [93130]- >3 YRS     Vaccine Administration, Initial [28633]        Primary Care Provider Fax #    Physician No Ref-Primary [phone]       No address on file        Equal Access to Services     ANIL ANDERSON : Hadii aad ku laylao Solorenaali, waaxda luqadaha, qaybta kaalmada adeegyada, waxay reguloin clemente hillladylencho walters . So [facility] [phone].    ATENCIÓN: Si habla español, tiene a germain disposición servicios gratuitos de asistencia lingüística. Llame al [phone].    We comply with applicable federal civil rights laws and [place] laws. We do not discriminate on the basis of race, color, national origin, age, disability, sex, sexual orientation, or gender identity.            Thank you!     Thank you for choosing [facility]  for your care. Our goal is always to provide you with excellent care. Hearing back from our patients is one way we can continue to improve our services. Please take a few minutes to complete the written survey that you may receive in the mail after your visit with us. Thank you!             Your Updated Medication List - Protect others around you: Learn how to safely use, store and throw away your medicines at www.disposemymeds.org.      Notice  As of 9/7/2018  2:05 PM    You have not been prescribed any medications.

## 2018-09-13 ENCOUNTER — ANESTHESIA EVENT (OUTPATIENT)
Dept: SURGERY | Facility: AMBULATORY SURGERY CENTER | Age: 28
End: 2018-09-13

## 2018-09-13 NOTE — ANESTHESIA PREPROCEDURE EVALUATION
Physical Exam  Normal systems: cardiovascular, pulmonary and dental    Airway   Mallampati: I  TM distance: >3 FB  Neck ROM: full    Dental     Cardiovascular   Rhythm and rate: regular and normal      Pulmonary    breath sounds clear to auscultation                    Anesthesia Plan      History & Physical Review  History and physical reviewed and following examination; no interval change.    ASA Status:  2 .    NPO Status:  > 8 hours    Plan for MAC with Intravenous and Propofol induction. Maintenance will be TIVA.  Reason for MAC:  Deep or markedly invasive procedure (G8)  PONV prophylaxis:  Ondansetron (or other 5HT-3) and Dexamethasone or Solumedrol       Postoperative Care  Postoperative pain management:  IV analgesics, Oral pain medications and Multi-modal analgesia.      Consents  Anesthetic plan, risks, benefits and alternatives discussed with:  Patient..          ANESTHESIA PREOP EVALUATION    PROCEDURE: Procedure(s):  Vasectomy - Wound Class:     HPI: Cheko Guidry is a 27 year old male who presents for above procedure.    PAST MEDICAL HISTORY:    Past Medical History:   Diagnosis Date     NO ACTIVE PROBLEMS        PAST SURGICAL HISTORY:    Past Surgical History:   Procedure Laterality Date     NO HISTORY OF SURGERY         PAST ANESTHESIA HISTORY:     No personal or family h/o anesthesia problems    SOCIAL HISTORY:       Social History   Substance Use Topics     Smoking status: Current Every Day Smoker     Packs/day: 0.25     Smokeless tobacco: Never Used     Alcohol use Yes      Comment: Occassionally.        ALLERGIES:     No Known Allergies    MEDICATIONS:       (Not in a hospital admission)    No current outpatient prescriptions on file.       No current Epic-ordered outpatient prescriptions on file.     No current [facility]-ordered facility-administered medications on file.        PHYSICAL EXAM:    Vitals: T Data Unavailable, P Data Unavailable, BP Data Unavailable, R Data Unavailable, SpO2  ,  Weight Wt Readings from Last 2 Encounters:   09/07/18 75.3 kg (166 lb)   01/12/18 77.8 kg (171 lb 9.6 oz)       See doc flowsheet      NPO STATUS: see doc flowsheet    LABS:    BMP:  No results for input(s): NA, POTASSIUM, CHLORIDE, CO2, BUN, CR, GLC, FLORI in the last 55244 hours.    LFTs:   No results for input(s): PROTTOTAL, ALBUMIN, BILITOTAL, ALKPHOS, AST, ALT, BILIDIRECT in the last 63641 hours.    CBC:   No results for input(s): WBC, RBC, HGB, HCT, MCV, MCH, MCHC, RDW, PLT in the last 91575 hours.    Coags:  No results for input(s): INR, PTT, FIBR in the last 64709 hours.    Imaging:  No orders to display       Ji Dinh MD  Anesthesiology Staff  Pager [phone]    9/13/2018  4:00 PM                    .

## 2018-09-17 ENCOUNTER — ANESTHESIA (OUTPATIENT)
Dept: SURGERY | Facility: AMBULATORY SURGERY CENTER | Age: 28
End: 2018-09-17
Payer: COMMERCIAL

## 2018-09-17 ENCOUNTER — HOSPITAL ENCOUNTER (OUTPATIENT)
Facility: AMBULATORY SURGERY CENTER | Age: 28
Discharge: HOME | End: 2018-09-17
Attending: UROLOGY | Admitting: UROLOGY
Payer: COMMERCIAL

## 2018-09-17 ENCOUNTER — SURGERY (OUTPATIENT)
Age: 28
End: 2018-09-17

## 2018-09-17 VITALS
SYSTOLIC BLOOD PRESSURE: 147 MMHG | TEMPERATURE: 97.6 F | DIASTOLIC BLOOD PRESSURE: 94 MMHG | OXYGEN SATURATION: 97 % | RESPIRATION RATE: 16 BRPM

## 2018-09-17 DIAGNOSIS — Z30.2 ENCOUNTER FOR VASECTOMY: Primary | ICD-10-CM

## 2018-09-17 PROCEDURE — G8907 PT DOC NO EVENTS ON DISCHARG: HCPCS

## 2018-09-17 PROCEDURE — 55250 REMOVAL OF SPERM DUCT(S): CPT | Mod: RT | Performed by: UROLOGY

## 2018-09-17 PROCEDURE — 88302 TISSUE EXAM BY PATHOLOGIST: CPT | Performed by: UROLOGY

## 2018-09-17 PROCEDURE — 55250 REMOVAL OF SPERM DUCT(S): CPT

## 2018-09-17 PROCEDURE — 55110 EXPLORE SCROTUM: CPT

## 2018-09-17 PROCEDURE — G8916 PT W IV AB GIVEN ON TIME: HCPCS

## 2018-09-17 RX ORDER — SODIUM CHLORIDE, SODIUM LACTATE, POTASSIUM CHLORIDE, CALCIUM CHLORIDE 600; 310; 30; 20 MG/100ML; MG/100ML; MG/100ML; MG/100ML
INJECTION, SOLUTION INTRAVENOUS CONTINUOUS
Status: DISCONTINUED | OUTPATIENT
Start: 2018-09-17 | End: 2018-09-18 | Stop reason: HOSPADM

## 2018-09-17 RX ORDER — LIDOCAINE HYDROCHLORIDE 10 MG/ML
INJECTION, SOLUTION INFILTRATION; PERINEURAL PRN
Status: DISCONTINUED | OUTPATIENT
Start: 2018-09-17 | End: 2018-09-17 | Stop reason: HOSPADM

## 2018-09-17 RX ORDER — FENTANYL CITRATE 50 UG/ML
25-50 INJECTION, SOLUTION INTRAMUSCULAR; INTRAVENOUS EVERY 5 MIN PRN
Status: DISCONTINUED | OUTPATIENT
Start: 2018-09-17 | End: 2018-09-18 | Stop reason: HOSPADM

## 2018-09-17 RX ORDER — PROPOFOL 10 MG/ML
INJECTION, EMULSION INTRAVENOUS PRN
Status: DISCONTINUED | OUTPATIENT
Start: 2018-09-17 | End: 2018-09-17

## 2018-09-17 RX ORDER — OXYCODONE HYDROCHLORIDE 5 MG/1
5 TABLET ORAL EVERY 4 HOURS PRN
Status: DISCONTINUED | OUTPATIENT
Start: 2018-09-17 | End: 2018-09-18 | Stop reason: HOSPADM

## 2018-09-17 RX ORDER — GABAPENTIN 300 MG/1
300 CAPSULE ORAL ONCE
Status: COMPLETED | OUTPATIENT
Start: 2018-09-17 | End: 2018-09-17

## 2018-09-17 RX ORDER — PROPOFOL 10 MG/ML
INJECTION, EMULSION INTRAVENOUS CONTINUOUS PRN
Status: DISCONTINUED | OUTPATIENT
Start: 2018-09-17 | End: 2018-09-17

## 2018-09-17 RX ORDER — ACETAMINOPHEN 325 MG/1
975 TABLET ORAL ONCE
Status: COMPLETED | OUTPATIENT
Start: 2018-09-17 | End: 2018-09-17

## 2018-09-17 RX ORDER — HYDROCODONE BITARTRATE AND ACETAMINOPHEN 5; 325 MG/1; MG/1
1-2 TABLET ORAL EVERY 4 HOURS PRN
Qty: 20 TABLET | Refills: 0 | Status: SHIPPED | OUTPATIENT
Start: 2018-09-17 | End: 2019-09-03

## 2018-09-17 RX ORDER — ONDANSETRON 2 MG/ML
4 INJECTION INTRAMUSCULAR; INTRAVENOUS EVERY 30 MIN PRN
Status: DISCONTINUED | OUTPATIENT
Start: 2018-09-17 | End: 2018-09-18 | Stop reason: HOSPADM

## 2018-09-17 RX ORDER — LIDOCAINE HYDROCHLORIDE 20 MG/ML
INJECTION, SOLUTION INFILTRATION; PERINEURAL PRN
Status: DISCONTINUED | OUTPATIENT
Start: 2018-09-17 | End: 2018-09-17

## 2018-09-17 RX ORDER — NALOXONE HYDROCHLORIDE 0.4 MG/ML
.1-.4 INJECTION, SOLUTION INTRAMUSCULAR; INTRAVENOUS; SUBCUTANEOUS
Status: DISCONTINUED | OUTPATIENT
Start: 2018-09-17 | End: 2018-09-18 | Stop reason: HOSPADM

## 2018-09-17 RX ORDER — CEFAZOLIN SODIUM 1 G/3ML
1 INJECTION, POWDER, FOR SOLUTION INTRAMUSCULAR; INTRAVENOUS SEE ADMIN INSTRUCTIONS
Status: DISCONTINUED | OUTPATIENT
Start: 2018-09-17 | End: 2018-09-18 | Stop reason: HOSPADM

## 2018-09-17 RX ORDER — HYDROMORPHONE HYDROCHLORIDE 1 MG/ML
.3-.5 INJECTION, SOLUTION INTRAMUSCULAR; INTRAVENOUS; SUBCUTANEOUS EVERY 10 MIN PRN
Status: DISCONTINUED | OUTPATIENT
Start: 2018-09-17 | End: 2018-09-18 | Stop reason: HOSPADM

## 2018-09-17 RX ORDER — CEFAZOLIN SODIUM 2 G/100ML
2 INJECTION, SOLUTION INTRAVENOUS
Status: COMPLETED | OUTPATIENT
Start: 2018-09-17 | End: 2018-09-17

## 2018-09-17 RX ORDER — ONDANSETRON 4 MG/1
4 TABLET, ORALLY DISINTEGRATING ORAL EVERY 30 MIN PRN
Status: DISCONTINUED | OUTPATIENT
Start: 2018-09-17 | End: 2018-09-18 | Stop reason: HOSPADM

## 2018-09-17 RX ORDER — FENTANYL CITRATE 50 UG/ML
INJECTION, SOLUTION INTRAMUSCULAR; INTRAVENOUS PRN
Status: DISCONTINUED | OUTPATIENT
Start: 2018-09-17 | End: 2018-09-17

## 2018-09-17 RX ORDER — LIDOCAINE 40 MG/G
CREAM TOPICAL
Status: DISCONTINUED | OUTPATIENT
Start: 2018-09-17 | End: 2018-09-18 | Stop reason: HOSPADM

## 2018-09-17 RX ORDER — ONDANSETRON 2 MG/ML
INJECTION INTRAMUSCULAR; INTRAVENOUS PRN
Status: DISCONTINUED | OUTPATIENT
Start: 2018-09-17 | End: 2018-09-17

## 2018-09-17 RX ADMIN — GABAPENTIN 300 MG: 300 CAPSULE ORAL at 11:32

## 2018-09-17 RX ADMIN — LIDOCAINE HYDROCHLORIDE 60 MG: 20 INJECTION, SOLUTION INFILTRATION; PERINEURAL at 11:47

## 2018-09-17 RX ADMIN — SODIUM CHLORIDE, SODIUM LACTATE, POTASSIUM CHLORIDE, CALCIUM CHLORIDE: 600; 310; 30; 20 INJECTION, SOLUTION INTRAVENOUS at 11:32

## 2018-09-17 RX ADMIN — FENTANYL CITRATE 50 MCG: 50 INJECTION, SOLUTION INTRAMUSCULAR; INTRAVENOUS at 11:44

## 2018-09-17 RX ADMIN — PROPOFOL 150 MCG/KG/MIN: 10 INJECTION, EMULSION INTRAVENOUS at 11:48

## 2018-09-17 RX ADMIN — LIDOCAINE HYDROCHLORIDE 7 ML: 10 INJECTION, SOLUTION INFILTRATION; PERINEURAL at 12:01

## 2018-09-17 RX ADMIN — PROPOFOL 40 MG: 10 INJECTION, EMULSION INTRAVENOUS at 11:47

## 2018-09-17 RX ADMIN — ONDANSETRON 4 MG: 2 INJECTION INTRAMUSCULAR; INTRAVENOUS at 11:59

## 2018-09-17 RX ADMIN — CEFAZOLIN SODIUM 2 G: 2 INJECTION, SOLUTION INTRAVENOUS at 11:48

## 2018-09-17 RX ADMIN — ACETAMINOPHEN 975 MG: 325 TABLET ORAL at 11:32

## 2018-09-17 RX ADMIN — FENTANYL CITRATE 50 MCG: 50 INJECTION, SOLUTION INTRAMUSCULAR; INTRAVENOUS at 11:47

## 2018-09-17 NOTE — ANESTHESIA CARE TRANSFER NOTE
Patient: Cheko Guidry    Procedure(s):  Vasectomy, and scrotal exploration. - Wound Class: I-Clean   - Wound Class: II-Clean Contaminated    Diagnosis: Request sterilization  Diagnosis Additional Information: No value filed.    Anesthesia Type:   MAC     Note:  Airway :Room Air  Patient transferred to:Phase II  Handoff Report: Identifed the Patient, Identified the Reponsible Provider, Reviewed the pertinent medical history, Discussed the surgical course, Reviewed Intra-OP anesthesia mangement and issues during anesthesia, Set expectations for post-procedure period and Allowed opportunity for questions and acknowledgement of understanding      Vitals: (Last set prior to Anesthesia Care Transfer)    CRNA VITALS  9/17/2018 1157 - 9/17/2018 1231      9/17/2018             Pulse: 78    SpO2: 97 %                Electronically Signed By: FRANKLIN Walker CRNA  September 17, 2018  12:31 PM

## 2018-09-17 NOTE — IP AVS SNAPSHOT
[facility]    [address] [place] [address]    Phone:  [phone]                                       After Visit Summary   9/17/2018    Cheko Guidry    MRN: [id number]           After Visit Summary Signature Page     I have received my discharge instructions, and my questions have been answered. I have discussed any challenges I see with this plan with the nurse or doctor.    ..........................................................................................................................................  Patient/Patient Representative Signature      ..........................................................................................................................................  Patient Representative Print Name and Relationship to Patient    ..................................................               ................................................  Date                                   Time    ..........................................................................................................................................  Reviewed by Signature/Title    ...................................................              ..............................................  Date                                               Time          22EPIC Rev 08/18

## 2018-09-17 NOTE — OP NOTE
Procedure Date: 2018      PREOPERATIVE DIAGNOSIS:  Encounter vasectomy.      POSTOPERATIVE DIAGNOSIS:  Encounter vasectomy.      PROCEDURE:   1.  Right vasectomy.   2.  Left scrotal exploration.      DESCRIPTION OF PROCEDURE:  The patient was brought to the operating room and placed supine.  Sedation was induced.  He was draped and prepped in the usual surgical fashion.  Preop antibiotic was given.  With palpation, the right vas deferens was identified without any problem.  The skin was anesthetized.  A small incision was made over the skin to identify the vas and the vas deferens was identified separate from the surrounding structures and excised.  About 1 cm vas was sent.  The lumen was cauterized and the NP cyst was tied off using a 2-0 silk suture.  The skin edges were then closed using a 3-0 chromic suture.  I then turned my attention to the left side.  I can feel some tubular structure in the scrotum, but it is very hard to know for sure what is going on.  I then made a small scrotal incision on the left side.  Incision was carried through the skin, dartos fascia.  The testis was identified and this was delivered to the outside.  I then proceeded to identify the spermatic cord and I did take quite a bit of time exploring this area.  He definitely has no obvious vas identify on this side.  After exploring for quite a bit of time and reassuring myself there was no vas identified, the testis was delivered to the scrotum.  The area was irrigated.  Dartos fascia was then closed using 3-0 chromic suture.  The skin edges were also closed using 3-0 chromic suture.  The patient tolerated the procedure well.  There were no complications identified during the procedure.  There was minimal bleeding during the operation.         AYAAN PINEDA MD             D: 2018   T: 2018   MT: [place]      Name:     PAUL LOVETT   MRN:      [id number]        Account:        [id number]   :      1990            Procedure Date: 09/17/2018      Document: [id number]

## 2018-09-17 NOTE — DISCHARGE INSTRUCTIONS
[facility]  Same-Day Surgery   Adult Discharge Orders & Instructions   For 24 hours after surgery  1. Get plenty of rest.  A responsible adult must stay with you for at least 24 hours after you leave the hospital.   2. Do not drive or use heavy equipment.  If you have weakness or tingling, don't drive or use heavy equipment until this feeling goes away.  3. Do not drink alcohol.  4. Avoid strenuous or risky activities.  Ask for help when climbing stairs.   5. You may feel lightheaded.  IF so, sit for a few minutes before standing.  Have someone help you get up.   6. If you have nausea (feel sick to your stomach): Drink only clear liquids such as apple juice, ginger ale, broth or 7-Up.  Rest may also help.  Be sure to drink enough fluids.  Move to a regular diet as you feel able.  7. You may have a slight fever. Call the doctor if your fever is over 100 F (37.7 C) (taken under the tongue) or lasts longer than 24 hours.  8. You may have a dry mouth, a sore throat, muscle aches or trouble sleeping.  These should go away after 24 hours.  9. Do not make important or legal decisions.   Call your doctor for any of the followin.  Signs of infection (fever, growing tenderness at the surgery site, a large amount of drainage or bleeding, severe pain, foul-smelling drainage, redness, swelling).    2. It has been over 8 to 10 hours since surgery and you are still not able to urinate (pass water).    3.  Headache for over 24 hours.      To contact Dr Munoz call:  [phone]    Tylenol was given at 11:30 am.

## 2018-09-17 NOTE — IP AVS SNAPSHOT
MRN:[id number]                      After Visit Summary   9/17/2018    Cheko Guidry    MRN: [id number]           Thank you!     Thank you for choosing [facility] for your care. Our goal is always to provide you with excellent care. Hearing back from our patients is one way we can continue to improve our services. Please take a few minutes to complete the written survey that you may receive in the mail after you visit with us. Thank you!        Patient Information     Date Of Birth          1990        About your hospital stay     You were admitted on:  September 17, 2018 You last received care in the:  [facility]    You were discharged on:  September 17, 2018       Who to Call     For medical emergencies, please call 911.  For non-urgent questions about your medical care, please call your primary care provider or clinic, None  For questions related to your surgery, please call your surgery clinic        Attending Provider     Provider Specialty    Russell Munoz MD Urology       Primary Care Provider Fax #    Physician No Ref-Primary [phone]      After Care Instructions     Diet Instructions       Resume pre procedure diet            Discharge Instructions       Call office this week for post vas instructions            No Alcohol       for 24 hours post procedure            No driving or operating machinery        until the day after procedure                  Your next 10 appointments already scheduled     Oct 05, 2018 12:00 PM CDT   Nurse Only with [facility]   [facility] [place] [facility]    [address]  [place] [id number] [phone]              Further instructions from your care team       [facility]  Same-Day Surgery   Adult Discharge Orders & Instructions   For 24 hours after surgery  1. Get plenty of rest.  A responsible adult must stay with you for at least 24 hours after you leave the  [facility].   2. Do not drive or use heavy equipment.  If you have weakness or tingling, don't drive or use heavy equipment until this feeling goes away.  3. Do not drink alcohol.  4. Avoid strenuous or risky activities.  Ask for help when climbing stairs.   5. You may feel lightheaded.  IF so, sit for a few minutes before standing.  Have someone help you get up.   6. If you have nausea (feel sick to your stomach): Drink only clear liquids such as apple juice, ginger ale, broth or 7-Up.  Rest may also help.  Be sure to drink enough fluids.  Move to a regular diet as you feel able.  7. You may have a slight fever. Call the doctor if your fever is over 100 F (37.7 C) (taken under the tongue) or lasts longer than 24 hours.  8. You may have a dry mouth, a sore throat, muscle aches or trouble sleeping.  These should go away after 24 hours.  9. Do not make important or legal decisions.   Call your doctor for any of the followin.  Signs of infection (fever, growing tenderness at the surgery site, a large amount of drainage or bleeding, severe pain, foul-smelling drainage, redness, swelling).    2. It has been over 8 to 10 hours since surgery and you are still not able to urinate (pass water).    3.  Headache for over 24 hours.      To contact Dr Munoz call:  [phone]    Tylenol was given at 11:30 am.        Pending Results     No orders found from 9/15/2018 to 2018.            Admission Information     Date & Time Provider Department Dept. Phone    2018 Russell Munoz MD [facility] [phone]      Your Vitals Were     Blood Pressure Temperature Respirations Pulse Oximetry          152/95 98.8  F (37.1  C) (Temporal) 16 99%        Care EveryWhere ID     This is your Care EveryWhere ID. This could be used by other organizations to access your [place] medical records  [id number]        Equal Access to Services     ANIL ANDERSON AH: pamela Mixon, ariel  roberto normanmansoor walters ah. Angy [facility] [phone].    ATENCIÓN: Si miguel gallardo, tiene a germain disposición servicios gratuitos de asistencia lingüística. Juli al [phone].    We comply with applicable federal civil rights laws and [place] laws. We do not discriminate on the basis of race, color, national origin, age, disability, sex, sexual orientation, or gender identity.               Review of your medicines      START taking        Dose / Directions    HYDROcodone-acetaminophen 5-325 MG per tablet   Commonly known as:  NORCO   Used for:  Encounter for vasectomy        Dose:  1-2 tablet   Take 1-2 tablets by mouth every 4 hours as needed for severe pain (Moderate to Severe Pain)   Quantity:  20 tablet   Refills:  0            Where to get your medicines      Some of these will need a paper prescription and others can be bought over the counter. Ask your nurse if you have questions.     Bring a paper prescription for each of these medications     HYDROcodone-acetaminophen 5-325 MG per tablet                Protect others around you: Learn how to safely use, store and throw away your medicines at www.disposemymeds.org.        Information about OPIOIDS     PRESCRIPTION OPIOIDS: WHAT YOU NEED TO KNOW   We gave you an opioid (narcotic) pain medicine. It is important to manage your pain, but opioids are not always the best choice. You should first try all the other options your care team gave you. Take this medicine for as short a time (and as few doses) as possible.    Some activities can increase your pain, such as bandage changes or therapy sessions. It may help to take your pain medicine 30 to 60 minutes before these activities. Reduce your stress by getting enough sleep, working on hobbies you enjoy and practicing relaxation or meditation. Talk to your care team about ways to manage your pain beyond prescription opioids.    These medicines have risks:    DO NOT drive when on  new or higher doses of pain medicine. These medicines can affect your alertness and reaction times, and you could be arrested for driving under the influence (DUI). If you need to use opioids long-term, talk to your care team about driving.    DO NOT operate heavy machinery    DO NOT do any other dangerous activities while taking these medicines.    DO NOT drink any alcohol while taking these medicines.     If the opioid prescribed includes acetaminophen, DO NOT take with any other medicines that contain acetaminophen. Read all labels carefully. Look for the word  acetaminophen  or  Tylenol.  Ask your pharmacist if you have questions or are unsure.    You can get addicted to pain medicines, especially if you have a history of addiction (chemical, alcohol or substance dependence). Talk to your care team about ways to reduce this risk.    All opioids tend to cause constipation. Drink plenty of water and eat foods that have a lot of fiber, such as fruits, vegetables, prune juice, apple juice and high-fiber cereal. Take a laxative (Miralax, milk of magnesia, Colace, Senna) if you don t move your bowels at least every other day. Other side effects include upset stomach, sleepiness, dizziness, throwing up, tolerance (needing more of the medicine to have the same effect), physical dependence and slowed breathing.    Store your pills in a secure place, locked if possible. We will not replace any lost or stolen medicine. If you don t finish your medicine, please throw away (dispose) as directed by your pharmacist. The [place] Pollution Control Agency has more information about safe disposal: https://www.pca.[facility].[place].us/living-green/managing-unwanted-medications             Medication List: This is a list of all your medications and when to take them. Check marks below indicate your daily home schedule. Keep this list as a reference.      Medications           Morning Afternoon Evening Bedtime As Needed     HYDROcodone-acetaminophen 5-325 MG per tablet   Commonly known as:  NORCO   Take 1-2 tablets by mouth every 4 hours as needed for severe pain (Moderate to Severe Pain)   Next Dose Due:  May take 1 at any time.

## 2018-09-17 NOTE — BRIEF OP NOTE
[facility]  Brief Operative Note    Pre-operative diagnosis: Encounter for vasectomy   Post-operative diagnosis same   Procedure: Right vasectomy  Left scrotal exploration   Surgeon: Russell Munoz MD   Assistants(s): None   Anesthesia: Local with MAC    Estimated blood loss: minimal                   Specimens: Right vas   Implants: None       Complications: None   Condition on discharge: Stable   Findings: No left vas  See dictated operative report for full details

## 2018-09-19 LAB — COPATH REPORT: NORMAL

## 2019-09-02 ENCOUNTER — TELEPHONE (OUTPATIENT)
Dept: FAMILY MEDICINE | Facility: CLINIC | Age: 29
End: 2019-09-02

## 2019-09-02 ENCOUNTER — TELEPHONE (OUTPATIENT)
Dept: URGENT CARE | Facility: URGENT CARE | Age: 29
End: 2019-09-02

## 2019-09-02 ENCOUNTER — ANCILLARY PROCEDURE (OUTPATIENT)
Dept: GENERAL RADIOLOGY | Facility: CLINIC | Age: 29
End: 2019-09-02
Attending: FAMILY MEDICINE
Payer: COMMERCIAL

## 2019-09-02 ENCOUNTER — TELEPHONE (OUTPATIENT)
Dept: OBGYN | Facility: CLINIC | Age: 29
End: 2019-09-02

## 2019-09-02 ENCOUNTER — NURSE TRIAGE (OUTPATIENT)
Dept: NURSING | Facility: CLINIC | Age: 29
End: 2019-09-02

## 2019-09-02 ENCOUNTER — OFFICE VISIT (OUTPATIENT)
Dept: URGENT CARE | Facility: URGENT CARE | Age: 29
End: 2019-09-02
Payer: COMMERCIAL

## 2019-09-02 VITALS
DIASTOLIC BLOOD PRESSURE: 88 MMHG | HEART RATE: 132 BPM | SYSTOLIC BLOOD PRESSURE: 150 MMHG | RESPIRATION RATE: 20 BRPM | OXYGEN SATURATION: 98 % | TEMPERATURE: 99.5 F | BODY MASS INDEX: 22.35 KG/M2 | WEIGHT: 164.8 LBS

## 2019-09-02 DIAGNOSIS — R31.9 HEMATURIA, UNSPECIFIED TYPE: ICD-10-CM

## 2019-09-02 DIAGNOSIS — G43.909 MIGRAINE WITHOUT STATUS MIGRAINOSUS, NOT INTRACTABLE, UNSPECIFIED MIGRAINE TYPE: ICD-10-CM

## 2019-09-02 DIAGNOSIS — R10.9 RIGHT FLANK PAIN: ICD-10-CM

## 2019-09-02 DIAGNOSIS — R30.0 DYSURIA: ICD-10-CM

## 2019-09-02 DIAGNOSIS — R30.0 DYSURIA: Primary | ICD-10-CM

## 2019-09-02 DIAGNOSIS — R82.90 NONSPECIFIC FINDING ON EXAMINATION OF URINE: ICD-10-CM

## 2019-09-02 LAB
ALBUMIN UR-MCNC: ABNORMAL MG/DL
APPEARANCE UR: ABNORMAL
BACTERIA #/AREA URNS HPF: ABNORMAL /HPF
BILIRUB UR QL STRIP: NEGATIVE
COLOR UR AUTO: YELLOW
GLUCOSE UR STRIP-MCNC: NEGATIVE MG/DL
HGB UR QL STRIP: ABNORMAL
KETONES UR STRIP-MCNC: NEGATIVE MG/DL
LEUKOCYTE ESTERASE UR QL STRIP: ABNORMAL
NITRATE UR QL: NEGATIVE
PH UR STRIP: 6 PH (ref 5–7)
RBC #/AREA URNS AUTO: ABNORMAL /HPF
SOURCE: ABNORMAL
SP GR UR STRIP: <=1.005 (ref 1–1.03)
UROBILINOGEN UR STRIP-ACNC: 0.2 EU/DL (ref 0.2–1)
WBC #/AREA URNS AUTO: ABNORMAL /HPF

## 2019-09-02 PROCEDURE — 81001 URINALYSIS AUTO W/SCOPE: CPT | Performed by: FAMILY MEDICINE

## 2019-09-02 PROCEDURE — 87186 SC STD MICRODIL/AGAR DIL: CPT | Performed by: FAMILY MEDICINE

## 2019-09-02 PROCEDURE — 87086 URINE CULTURE/COLONY COUNT: CPT | Performed by: FAMILY MEDICINE

## 2019-09-02 PROCEDURE — 87088 URINE BACTERIA CULTURE: CPT | Performed by: FAMILY MEDICINE

## 2019-09-02 PROCEDURE — 74019 RADEX ABDOMEN 2 VIEWS: CPT

## 2019-09-02 PROCEDURE — 96372 THER/PROPH/DIAG INJ SC/IM: CPT | Performed by: FAMILY MEDICINE

## 2019-09-02 PROCEDURE — 99214 OFFICE O/P EST MOD 30 MIN: CPT | Mod: 25 | Performed by: FAMILY MEDICINE

## 2019-09-02 RX ORDER — CIPROFLOXACIN 500 MG/1
500 TABLET, FILM COATED ORAL 2 TIMES DAILY
Qty: 14 TABLET | Refills: 0 | Status: SHIPPED | OUTPATIENT
Start: 2019-09-02 | End: 2019-09-02

## 2019-09-02 RX ORDER — TAMSULOSIN HYDROCHLORIDE 0.4 MG/1
0.4 CAPSULE ORAL AT BEDTIME
Qty: 30 CAPSULE | Refills: 0 | Status: SHIPPED | OUTPATIENT
Start: 2019-09-02 | End: 2019-09-02

## 2019-09-02 RX ORDER — CIPROFLOXACIN 500 MG/1
500 TABLET, FILM COATED ORAL 2 TIMES DAILY
Qty: 14 TABLET | Refills: 0 | Status: SHIPPED | OUTPATIENT
Start: 2019-09-02 | End: 2020-12-23

## 2019-09-02 RX ORDER — TAMSULOSIN HYDROCHLORIDE 0.4 MG/1
0.4 CAPSULE ORAL AT BEDTIME
Qty: 30 CAPSULE | Refills: 0 | Status: ON HOLD | OUTPATIENT
Start: 2019-09-02 | End: 2019-09-04

## 2019-09-02 RX ORDER — KETOROLAC TROMETHAMINE 30 MG/ML
30 INJECTION, SOLUTION INTRAMUSCULAR; INTRAVENOUS ONCE
Status: DISCONTINUED | OUTPATIENT
Start: 2019-09-02 | End: 2019-09-02

## 2019-09-02 RX ADMIN — KETOROLAC TROMETHAMINE 30 MG: 30 INJECTION, SOLUTION INTRAMUSCULAR; INTRAVENOUS at 13:05

## 2019-09-02 ASSESSMENT — PAIN SCALES - GENERAL: PAINLEVEL: WORST PAIN (10)

## 2019-09-02 NOTE — TELEPHONE ENCOUNTER
Patient needs prescriptions from today called in to [facility] pharmacy in [place] [address].  Also sent electronically.    Leanne Aguirre RN  [facility]

## 2019-09-02 NOTE — PROGRESS NOTES
SUBJECTIVE:   Cheko Guidry is a 28 year old male presenting with a chief complaint of   Chief Complaint   Patient presents with     UTI     x Weds. started with frequency urination, red color in urine-patient states he not sure if it blood, back and stomach pain     Headache     x 5 days--been taking extra strength Tylenol and aspirin     He is an established patient of [facility].  She comes in today with 5 days history of low back pain, left flank pain, noticed blood last night.  He reports the pain is better today.  He also reports nausea, no fever no chills.  He reports no previous injury no previous history of kidney stone.  He had no pain with urination.  No discharges.    In addition, he reports he has migraine headache denies being causing more throbbing pain in the left frontal area.  Denies any change in his vision, denies being lightheaded or dizzy, has no sore throat, no diarrhea or vomiting.    Review of Systems   ROS: 10 point ROS neg other than the symptoms noted above in the HPI.  Past Medical History:   Diagnosis Date     NO ACTIVE PROBLEMS      Family History   Problem Relation Age of Onset     Heart Disease No family hx of      Cancer No family hx of      Diabetes No family hx of      Current Outpatient Medications   Medication Sig Dispense Refill     ASPIRIN PO        diphenhydrAMINE-APAP, sleep, (TYLENOL PM EXTRA STRENGTH PO)        aspirin-acetaminophen-caffeine (EXCEDRIN MIGRAINE) 250-250-65 MG tablet One tab every 8 hr's/ as needed for headache. 30 tablet 1     ciprofloxacin (CIPRO) 500 MG tablet Take 1 tablet (500 mg) by mouth 2 times daily 14 tablet 0     HYDROcodone-acetaminophen (NORCO) 5-325 MG per tablet Take 1-2 tablets by mouth every 4 hours as needed for severe pain (Moderate to Severe Pain) (Patient not taking: Reported on 9/2/2019) 20 tablet 0     tamsulosin (FLOMAX) 0.4 MG capsule Take 1 capsule (0.4 mg) by mouth At Bedtime 30 capsule 0     Social History     Tobacco Use     Smoking  status: Current Every Day Smoker     Packs/day: 0.25     Smokeless tobacco: Never Used   Substance Use Topics     Alcohol use: Yes     Comment: Occassionally.        OBJECTIVE  BP (!) 150/88   Pulse 132   Temp 99.5  F (37.5  C) (Oral)   Resp 20   Wt 74.8 kg (164 lb 12.8 oz)   SpO2 98%   BMI 22.35 kg/m      Physical Exam  EXAM:  Constitutional: healthy, alert and no distress   Cardiovascular: negative, PMI normal. No lifts, heaves, or thrills. RRR. No murmurs, clicks gallops or rub  Respiratory: negative, Percussion normal. Good diaphragmatic excursion. Lungs clear  Psychiatric: mentation appears normal and affect normal/bright  Abdomen: Abdomen soft, non-tender. BS normal. No masses, organomegaly  No CVA Tenderness  Labs:  Results for orders placed or performed in visit on 09/02/19 (from the past 24 hour(s))   *UA reflex to Microscopic and Culture ([place] and [facility] (except [place] and [place])   Result Value Ref Range    Color Urine Yellow     Appearance Urine Slightly Cloudy     Glucose Urine Negative NEG^Negative mg/dL    Bilirubin Urine Negative NEG^Negative    Ketones Urine Negative NEG^Negative mg/dL    Specific Gravity Urine <=1.005 1.003 - 1.035    Blood Urine Moderate (A) NEG^Negative    pH Urine 6.0 5.0 - 7.0 pH    Protein Albumin Urine Trace (A) NEG^Negative mg/dL    Urobilinogen Urine 0.2 0.2 - 1.0 EU/dL    Nitrite Urine Negative NEG^Negative    Leukocyte Esterase Urine Moderate (A) NEG^Negative    Source Midstream Urine    Urine Microscopic   Result Value Ref Range    WBC Urine  (A) OTO5^0 - 5 /HPF    RBC Urine 2-5 (A) OTO2^O - 2 /HPF    Bacteria Urine Few (A) NEG^Negative /HPF     Recent Results (from the past 24 hour(s))   XR KUB    Narrative    ABDOMEN ONE VIEW  9/2/2019 1:16 PM    HISTORY: Hematuria, unspecified type    COMPARISON: None.      Impression    IMPRESSION: Unremarkable bowel gas pattern. No definite renal calculi  over the kidneys or over the expected course of  the ureter. There are  calcifications projecting projecting over the pelvis which are  nonspecific and may represent phleboliths, bladder stones or distal  ureteral stones.    BERTO RENE MD         ASSESSMENT:      ICD-10-CM    1. Dysuria R30.0 *UA reflex to Microscopic and Culture ([place] and [facility] (except [place] and [place])     Urine Microscopic     ketorolac (TORADOL) injection 30 mg     KETOROLAC TROMETHAMINE 15MG     INJECTION INTRAMUSCULAR OR SUB-Q     DISCONTINUED: ciprofloxacin (CIPRO) 500 MG tablet   2. Nonspecific finding on examination of urine R82.90 Urine Culture Aerobic Bacterial     KETOROLAC TROMETHAMINE 15MG     INJECTION INTRAMUSCULAR OR SUB-Q   3. Right flank pain R10.9 ketorolac (TORADOL) injection 30 mg     KETOROLAC TROMETHAMINE 15MG     INJECTION INTRAMUSCULAR OR SUB-Q     DISCONTINUED: tamsulosin (FLOMAX) 0.4 MG capsule   4. Hematuria, unspecified type R31.9 ketorolac (TORADOL) injection 30 mg     XR KUB     KETOROLAC TROMETHAMINE 15MG     INJECTION INTRAMUSCULAR OR SUB-Q     DISCONTINUED: ciprofloxacin (CIPRO) 500 MG tablet   5. Migraine without status migrainosus, not intractable, unspecified migraine type G43.909 DISCONTINUED: aspirin-acetaminophen-caffeine (EXCEDRIN MIGRAINE) 250-250-65 MG tablet   Reviewed his lab results, his x-ray results..  He does have a urinary tract infection, possible underlying kidney stone.  His final report of his x-ray showed calcification however there was not 100% indication of kidney stone.  He was started on ciprofloxacin.  Was advised to increase his fluid intake, in addition given Flomax.  For his migraine headache he was given Excedrin headache he has been prescribed.    In addition because of his headache he was given Toradol Toradol 30 mg IM in the clinic today.    I did call the patient today he reports he still the same.  I did discuss with the patient if symptoms worsening, or if he started having any fever, chills, night sweats,  abdominal pain nausea or vomiting to go to the ER.  Followup:    If not improving or if conditions worsens over the next next 12 or less hours, go to the Emergency Department    There are no Patient Instructions on file for this visit.      Sher Madrigal MD.

## 2019-09-02 NOTE — TELEPHONE ENCOUNTER
I called patient regarding his x-ray result, was not 100 % sure , if there was a kidney stone or not.  Discussed with patient other differential could be pyelonephritis, I told the patient if symptoms worsening or he is continued to have started having fever or chills to go to the ER.    Advised him to start taking his ciprofloxacin tonight, Dlomax, drink a lot of water.  And if he started having any fever, chills, night sweats, nausea, abdominal pain to go to the ER.

## 2019-09-02 NOTE — NURSING NOTE
Clinic Administered Medication Documentation      Injectable Medication Documentation    Patient was given Ketorolac Tromethamine (Toradol). Prior to medication administration, verified patients identity using patient s name and date of birth. Please see MAR and medication order for additional information. Patient instructed to remain in clinic for 15 minutes.      Was entire vial of medication used? Yes  Vial/Syringe: Single dose vial  Expiration Date:  03/21  Was this medication supplied by the patient? No     Jacques Reed CMA

## 2019-09-02 NOTE — TELEPHONE ENCOUNTER
Patient needs prescriptions from today called in to [facility] pharmacy in [place] [address].  Also changed electronically.    Leanne Aguirre RN  [facility]    Reason for Disposition    Caller has medication question only, adult not sick, and triager answers question    Protocols used: MEDICATION QUESTION CALL-A-

## 2019-09-03 ENCOUNTER — TELEPHONE (OUTPATIENT)
Dept: FAMILY MEDICINE | Facility: CLINIC | Age: 29
End: 2019-09-03

## 2019-09-03 ENCOUNTER — APPOINTMENT (OUTPATIENT)
Dept: CT IMAGING | Facility: CLINIC | Age: 29
End: 2019-09-03
Attending: EMERGENCY MEDICINE
Payer: COMMERCIAL

## 2019-09-03 ENCOUNTER — HOSPITAL ENCOUNTER (INPATIENT)
Facility: CLINIC | Age: 29
LOS: 2 days | Discharge: HOME | End: 2019-09-05
Attending: EMERGENCY MEDICINE | Admitting: HOSPITALIST
Payer: COMMERCIAL

## 2019-09-03 DIAGNOSIS — R03.0 ELEVATED BLOOD PRESSURE READING WITHOUT DIAGNOSIS OF HYPERTENSION: Primary | ICD-10-CM

## 2019-09-03 DIAGNOSIS — N12 PYELONEPHRITIS: ICD-10-CM

## 2019-09-03 PROBLEM — A41.9 SEPSIS (H): Status: ACTIVE | Noted: 2019-09-03

## 2019-09-03 LAB
ALBUMIN UR-MCNC: 30 MG/DL
ANION GAP SERPL CALCULATED.3IONS-SCNC: 8 MMOL/L (ref 3–14)
APPEARANCE UR: CLEAR
BASOPHILS # BLD AUTO: 0 10E9/L (ref 0–0.2)
BASOPHILS NFR BLD AUTO: 0.1 %
BILIRUB UR QL STRIP: NEGATIVE
BUN SERPL-MCNC: 7 MG/DL (ref 7–30)
CALCIUM SERPL-MCNC: 9.1 MG/DL (ref 8.5–10.1)
CHLORIDE SERPL-SCNC: 93 MMOL/L (ref 94–109)
CO2 SERPL-SCNC: 30 MMOL/L (ref 20–32)
COLOR UR AUTO: YELLOW
CREAT SERPL-MCNC: 1.14 MG/DL (ref 0.66–1.25)
DIFFERENTIAL METHOD BLD: ABNORMAL
EOSINOPHIL # BLD AUTO: 0 10E9/L (ref 0–0.7)
EOSINOPHIL NFR BLD AUTO: 0 %
ERYTHROCYTE [DISTWIDTH] IN BLOOD BY AUTOMATED COUNT: 12.4 % (ref 10–15)
GFR SERPL CREATININE-BSD FRML MDRD: 86 ML/MIN/{1.73_M2}
GLUCOSE SERPL-MCNC: 102 MG/DL (ref 70–99)
GLUCOSE UR STRIP-MCNC: NEGATIVE MG/DL
HCT VFR BLD AUTO: 43.1 % (ref 40–53)
HGB BLD-MCNC: 14.5 G/DL (ref 13.3–17.7)
HGB UR QL STRIP: ABNORMAL
IMM GRANULOCYTES # BLD: 0.1 10E9/L (ref 0–0.4)
IMM GRANULOCYTES NFR BLD: 0.8 %
KETONES UR STRIP-MCNC: NEGATIVE MG/DL
LACTATE BLD-SCNC: 1 MMOL/L (ref 0.7–2)
LEUKOCYTE ESTERASE UR QL STRIP: ABNORMAL
LYMPHOCYTES # BLD AUTO: 1.1 10E9/L (ref 0.8–5.3)
LYMPHOCYTES NFR BLD AUTO: 5.7 %
MCH RBC QN AUTO: 33 PG (ref 26.5–33)
MCHC RBC AUTO-ENTMCNC: 33.6 G/DL (ref 31.5–36.5)
MCV RBC AUTO: 98 FL (ref 78–100)
MONOCYTES # BLD AUTO: 2.2 10E9/L (ref 0–1.3)
MONOCYTES NFR BLD AUTO: 12 %
NEUTROPHILS # BLD AUTO: 14.9 10E9/L (ref 1.6–8.3)
NEUTROPHILS NFR BLD AUTO: 81.4 %
NITRATE UR QL: NEGATIVE
NRBC # BLD AUTO: 0 10*3/UL
NRBC BLD AUTO-RTO: 0 /100
PH UR STRIP: 7.5 PH (ref 5–7)
PLATELET # BLD AUTO: 283 10E9/L (ref 150–450)
POTASSIUM SERPL-SCNC: 3.6 MMOL/L (ref 3.4–5.3)
RBC # BLD AUTO: 4.39 10E12/L (ref 4.4–5.9)
RBC #/AREA URNS AUTO: 1 /HPF (ref 0–2)
SODIUM SERPL-SCNC: 131 MMOL/L (ref 133–144)
SOURCE: ABNORMAL
SP GR UR STRIP: 1 (ref 1–1.03)
UROBILINOGEN UR STRIP-MCNC: 2 MG/DL (ref 0–2)
WBC # BLD AUTO: 18.3 10E9/L (ref 4–11)
WBC #/AREA URNS AUTO: 11 /HPF (ref 0–5)

## 2019-09-03 PROCEDURE — 87040 BLOOD CULTURE FOR BACTERIA: CPT | Performed by: EMERGENCY MEDICINE

## 2019-09-03 PROCEDURE — HZ2ZZZZ DETOXIFICATION SERVICES FOR SUBSTANCE ABUSE TREATMENT: ICD-10-PCS | Performed by: INTERNAL MEDICINE

## 2019-09-03 PROCEDURE — 25000128 H RX IP 250 OP 636: Performed by: EMERGENCY MEDICINE

## 2019-09-03 PROCEDURE — 36415 COLL VENOUS BLD VENIPUNCTURE: CPT | Performed by: HOSPITALIST

## 2019-09-03 PROCEDURE — 25800030 ZZH RX IP 258 OP 636: Performed by: EMERGENCY MEDICINE

## 2019-09-03 PROCEDURE — 74177 CT ABD & PELVIS W/CONTRAST: CPT

## 2019-09-03 PROCEDURE — 25000125 ZZHC RX 250: Performed by: EMERGENCY MEDICINE

## 2019-09-03 PROCEDURE — 81001 URINALYSIS AUTO W/SCOPE: CPT | Performed by: EMERGENCY MEDICINE

## 2019-09-03 PROCEDURE — 25000132 ZZH RX MED GY IP 250 OP 250 PS 637: Performed by: HOSPITALIST

## 2019-09-03 PROCEDURE — 87086 URINE CULTURE/COLONY COUNT: CPT | Performed by: EMERGENCY MEDICINE

## 2019-09-03 PROCEDURE — 80048 BASIC METABOLIC PNL TOTAL CA: CPT | Performed by: EMERGENCY MEDICINE

## 2019-09-03 PROCEDURE — 25000132 ZZH RX MED GY IP 250 OP 250 PS 637: Performed by: EMERGENCY MEDICINE

## 2019-09-03 PROCEDURE — 25000128 H RX IP 250 OP 636: Performed by: HOSPITALIST

## 2019-09-03 PROCEDURE — 96365 THER/PROPH/DIAG IV INF INIT: CPT | Mod: 59 | Performed by: EMERGENCY MEDICINE

## 2019-09-03 PROCEDURE — 85025 COMPLETE CBC W/AUTO DIFF WBC: CPT | Performed by: EMERGENCY MEDICINE

## 2019-09-03 PROCEDURE — 96375 TX/PRO/DX INJ NEW DRUG ADDON: CPT | Performed by: EMERGENCY MEDICINE

## 2019-09-03 PROCEDURE — 12000001 ZZH R&B MED SURG/OB UMMC

## 2019-09-03 PROCEDURE — 96366 THER/PROPH/DIAG IV INF ADDON: CPT | Performed by: EMERGENCY MEDICINE

## 2019-09-03 PROCEDURE — 99285 EMERGENCY DEPT VISIT HI MDM: CPT | Mod: 25 | Performed by: EMERGENCY MEDICINE

## 2019-09-03 PROCEDURE — 99223 1ST HOSP IP/OBS HIGH 75: CPT | Mod: AI | Performed by: HOSPITALIST

## 2019-09-03 PROCEDURE — 96361 HYDRATE IV INFUSION ADD-ON: CPT | Performed by: EMERGENCY MEDICINE

## 2019-09-03 PROCEDURE — 83605 ASSAY OF LACTIC ACID: CPT | Performed by: HOSPITALIST

## 2019-09-03 RX ORDER — ONDANSETRON 2 MG/ML
4 INJECTION INTRAMUSCULAR; INTRAVENOUS EVERY 6 HOURS PRN
Status: DISCONTINUED | OUTPATIENT
Start: 2019-09-03 | End: 2019-09-05 | Stop reason: HOSPADM

## 2019-09-03 RX ORDER — DIAZEPAM 5 MG
5-20 TABLET ORAL EVERY 30 MIN PRN
Status: DISCONTINUED | OUTPATIENT
Start: 2019-09-03 | End: 2019-09-05 | Stop reason: HOSPADM

## 2019-09-03 RX ORDER — ACETAMINOPHEN 500 MG
500 TABLET ORAL EVERY 6 HOURS PRN
Status: ON HOLD | COMMUNITY
End: 2019-09-04

## 2019-09-03 RX ORDER — SODIUM CHLORIDE 9 MG/ML
INJECTION, SOLUTION INTRAVENOUS CONTINUOUS
Status: DISCONTINUED | OUTPATIENT
Start: 2019-09-03 | End: 2019-09-05

## 2019-09-03 RX ORDER — LABETALOL 20 MG/4 ML (5 MG/ML) INTRAVENOUS SYRINGE
10
Status: DISCONTINUED | OUTPATIENT
Start: 2019-09-03 | End: 2019-09-05 | Stop reason: HOSPADM

## 2019-09-03 RX ORDER — SODIUM CHLORIDE 9 MG/ML
1000 INJECTION, SOLUTION INTRAVENOUS CONTINUOUS
Status: DISCONTINUED | OUTPATIENT
Start: 2019-09-03 | End: 2019-09-05

## 2019-09-03 RX ORDER — ALUMINA, MAGNESIA, AND SIMETHICONE 2400; 2400; 240 MG/30ML; MG/30ML; MG/30ML
15 SUSPENSION ORAL EVERY 4 HOURS PRN
Status: DISCONTINUED | OUTPATIENT
Start: 2019-09-03 | End: 2019-09-05 | Stop reason: HOSPADM

## 2019-09-03 RX ORDER — ACETAMINOPHEN 325 MG/1
650 TABLET ORAL EVERY 4 HOURS PRN
Status: DISCONTINUED | OUTPATIENT
Start: 2019-09-03 | End: 2019-09-05 | Stop reason: HOSPADM

## 2019-09-03 RX ORDER — IOPAMIDOL 755 MG/ML
100 INJECTION, SOLUTION INTRAVASCULAR ONCE
Status: COMPLETED | OUTPATIENT
Start: 2019-09-03 | End: 2019-09-03

## 2019-09-03 RX ORDER — NALOXONE HYDROCHLORIDE 0.4 MG/ML
.1-.4 INJECTION, SOLUTION INTRAMUSCULAR; INTRAVENOUS; SUBCUTANEOUS
Status: DISCONTINUED | OUTPATIENT
Start: 2019-09-03 | End: 2019-09-05 | Stop reason: HOSPADM

## 2019-09-03 RX ORDER — IBUPROFEN 200 MG
400 TABLET ORAL EVERY 6 HOURS PRN
Status: ON HOLD | COMMUNITY
End: 2019-09-04

## 2019-09-03 RX ORDER — CEFTRIAXONE 1 G/1
1 INJECTION, POWDER, FOR SOLUTION INTRAMUSCULAR; INTRAVENOUS ONCE
Status: COMPLETED | OUTPATIENT
Start: 2019-09-03 | End: 2019-09-03

## 2019-09-03 RX ORDER — ACETAMINOPHEN 500 MG
1000 TABLET ORAL ONCE
Status: COMPLETED | OUTPATIENT
Start: 2019-09-03 | End: 2019-09-03

## 2019-09-03 RX ORDER — IBUPROFEN 600 MG/1
600 TABLET, FILM COATED ORAL EVERY 6 HOURS PRN
Status: DISCONTINUED | OUTPATIENT
Start: 2019-09-03 | End: 2019-09-05 | Stop reason: HOSPADM

## 2019-09-03 RX ORDER — CEFTRIAXONE 1 G/1
1 INJECTION, POWDER, FOR SOLUTION INTRAMUSCULAR; INTRAVENOUS EVERY 24 HOURS
Status: DISCONTINUED | OUTPATIENT
Start: 2019-09-03 | End: 2019-09-04

## 2019-09-03 RX ORDER — LIDOCAINE 40 MG/G
CREAM TOPICAL
Status: DISCONTINUED | OUTPATIENT
Start: 2019-09-03 | End: 2019-09-05 | Stop reason: HOSPADM

## 2019-09-03 RX ORDER — ONDANSETRON 4 MG/1
4 TABLET, ORALLY DISINTEGRATING ORAL EVERY 6 HOURS PRN
Status: DISCONTINUED | OUTPATIENT
Start: 2019-09-03 | End: 2019-09-05 | Stop reason: HOSPADM

## 2019-09-03 RX ORDER — KETOROLAC TROMETHAMINE 30 MG/ML
30 INJECTION, SOLUTION INTRAMUSCULAR; INTRAVENOUS ONCE
Status: COMPLETED | OUTPATIENT
Start: 2019-09-03 | End: 2019-09-03

## 2019-09-03 RX ADMIN — KETOROLAC TROMETHAMINE 30 MG: 30 INJECTION, SOLUTION INTRAMUSCULAR at 15:29

## 2019-09-03 RX ADMIN — SODIUM CHLORIDE 1000 ML: 9 INJECTION, SOLUTION INTRAVENOUS at 17:43

## 2019-09-03 RX ADMIN — IOPAMIDOL 80 ML: 755 INJECTION, SOLUTION INTRAVENOUS at 14:45

## 2019-09-03 RX ADMIN — CEFTRIAXONE SODIUM 1 G: 1 INJECTION, POWDER, FOR SOLUTION INTRAMUSCULAR; INTRAVENOUS at 17:43

## 2019-09-03 RX ADMIN — SODIUM CHLORIDE 1000 ML: 9 INJECTION, SOLUTION INTRAVENOUS at 14:31

## 2019-09-03 RX ADMIN — ACETAMINOPHEN 1000 MG: 500 TABLET ORAL at 14:31

## 2019-09-03 RX ADMIN — ACETAMINOPHEN 650 MG: 325 TABLET ORAL at 22:13

## 2019-09-03 RX ADMIN — PROCHLORPERAZINE EDISYLATE 10 MG: 5 INJECTION INTRAMUSCULAR; INTRAVENOUS at 14:31

## 2019-09-03 RX ADMIN — SODIUM CHLORIDE 60 ML: 9 INJECTION, SOLUTION INTRAVENOUS at 14:45

## 2019-09-03 RX ADMIN — CEFTRIAXONE SODIUM 1 G: 1 INJECTION, POWDER, FOR SOLUTION INTRAMUSCULAR; INTRAVENOUS at 16:45

## 2019-09-03 RX ADMIN — SODIUM CHLORIDE 1000 ML: 9 INJECTION, SOLUTION INTRAVENOUS at 15:33

## 2019-09-03 ASSESSMENT — ENCOUNTER SYMPTOMS
HEMATURIA: 1
SLEEP DISTURBANCE: 1
HEADACHES: 1
DIFFICULTY URINATING: 1
FEVER: 1
APPETITE CHANGE: 1
FREQUENCY: 1
BACK PAIN: 1
ABDOMINAL PAIN: 1
DYSURIA: 1
PHOTOPHOBIA: 0
NECK PAIN: 0

## 2019-09-03 ASSESSMENT — ACTIVITIES OF DAILY LIVING (ADL): ADLS_ACUITY_SCORE: 14

## 2019-09-03 NOTE — ED PROVIDER NOTES
"    [facility] EMERGENCY DEPARTMENT ([facility])    9/03/19        History     Chief Complaint   Patient presents with     Headache     The history is provided by the patient and medical records.     Cheko Guidry is a 28 year old male with a past medical history significant for vasectomy (9/2018) who presents here to the Emergency Department due to a headache.    Per chart review patient was seen at [facility] on 9/2/2019 due to lower back pain, left flank pain and hematuria. At that time he had noted a migraine headache. He denied vision changes, lightheadedness or dizziness. He was found to have a UTI with a possible underlying kidney stone. He was started on ciprofloxacin and was advised to increase his fluid intake. He was additionally given Flomax. For the headache he was given Excedrin and Toradol. Patient had a temperature of 99.5 F at that time and is now 101.2 F here in the ED.    Patient presents here to the ED due to worsening headache. He noted his symptoms started out just as troubles urinating, dysuria, blood in urine and increased frequency on 8/28/2019 and had progressed to abdominal pain and headaches on 8/29/2019. He stated that by this time he had been urinating fine and he only had occasional dysuria. Patient notes that he has had \"lighting bolts\" none stop in his head since that time. He states that they are shooting in random spots and has pressure when he is not experiencing the \"lightining bolts\". States his whole head is tender. Patient had been taking Asprin, tylenol and ibuprofen without relief of his symptoms. He notes that he has been unable to sleep or eat due to his symptoms. He is also reporting that he feels generally unwell. Denies neck pain, difficulty walking, talking or other neurologic symptoms. Denies photophobia. Doesn't not believe he has an STD and states he had not been tested at the . He is reporting back pain where his spine connects to his hip " "and states it feels as if something is \"pinched\". Denies alcohol use since August.    I have reviewed the Medications, Allergies, Past Medical and Surgical History, and Social History in the [facility].    Past Medical History:   Diagnosis Date     NO ACTIVE PROBLEMS        Past Surgical History:   Procedure Laterality Date     EXPLORE SCROTUM  9/17/2018    Procedure: EXPLORE SCROTUM;;  Surgeon: Russell Munoz MD;  Location: [facility] OR     NO HISTORY OF SURGERY       VASECTOMY N/A 9/17/2018    Procedure: VASECTOMY;  Vasectomy, and scrotal exploration.;  Surgeon: Russell Munoz MD;  Location: [facility] OR       Family History   Problem Relation Age of Onset     Heart Disease No family hx of      Cancer No family hx of      Diabetes No family hx of        Social History     Tobacco Use     Smoking status: Current Every Day Smoker     Packs/day: 0.25     Smokeless tobacco: Never Used   Substance Use Topics     Alcohol use: Yes     Comment: Occassionally.        Current Facility-Administered Medications   Medication     0.9% sodium chloride BOLUS     acetaminophen (TYLENOL) tablet 650 mg     alum & mag hydroxide-simethicone (MYLANTA ES/MAALOX  ES) suspension 15 mL     cefTRIAXone (ROCEPHIN) 1 g vial to attach to  mL bag for ADULTS or NS 50 mL bag for PEDS     diazepam (VALIUM) tablet 5-20 mg     ibuprofen (ADVIL/MOTRIN) tablet 600 mg     lidocaine (LMX4) cream     lidocaine 1 % 0.1-1 mL     melatonin tablet 1 mg     naloxone (NARCAN) injection 0.1-0.4 mg     nicotine (NICORETTE) gum 2 mg     ondansetron (ZOFRAN-ODT) ODT tab 4 mg    Or     ondansetron (ZOFRAN) injection 4 mg     sodium chloride (PF) 0.9% PF flush 3 mL     sodium chloride (PF) 0.9% PF flush 3 mL     sodium chloride 0.9% infusion     sodium chloride 0.9% infusion     Current Outpatient Medications   Medication     acetaminophen (TYLENOL) 500 MG tablet     aspirin-acetaminophen-caffeine (EXCEDRIN MIGRAINE) 250-250-65 MG tablet     ciprofloxacin (CIPRO) " 500 MG tablet     ibuprofen (ADVIL/MOTRIN) 200 MG tablet     tamsulosin (FLOMAX) 0.4 MG capsule      No Known Allergies      Review of Systems   Constitutional: Positive for appetite change (Decreased) and fever.   Eyes: Negative for photophobia.   Gastrointestinal: Positive for abdominal pain.   Genitourinary: Positive for difficulty urinating, dysuria (Resolved), frequency (Resolved) and hematuria. Negative for discharge, penile pain and penile swelling.   Musculoskeletal: Positive for back pain. Negative for gait problem and neck pain.   Neurological: Positive for headaches.   Psychiatric/Behavioral: Positive for sleep disturbance.   All other systems reviewed and are negative.      Physical Exam   BP: (!) 164/103  Pulse: 138  Temp: 101.2  F (38.4  C)  Resp: 16  Weight: 74.4 kg (164 lb)  SpO2: 97 %      Physical Exam  General: awake, alert, appears uncomfortable  Head: normal cephalic  HEENT: pupils equal, conjugate gaze in tact, no photophobia  Neck: Supple, no meningismus  CV: Tachycardia, no murmurs.  Lungs: clear to ascultation  Abd: soft, she has right lower quadrant tenderness to exam, mild suprapubic tenderness.  No guarding or peritoneal signs  Back: Patient has significant right sided flank tenderness  EXT: lower extremities without swelling or edema  Neuro: awake, answers questions appropriately. No focal deficits noted; cranial nerves II through XII intact.  Normal finger-to-nose testing.  Normal heel-to-shin testing.  5 out of 5 strength in bilateral upper and lower extremity's.    ED Course   1:35 PM  The patient was seen and examined by Vince Gregory MD in Room ED18.           Medications   0.9% sodium chloride BOLUS (0 mLs Intravenous Stopped 9/3/19 1532)     Followed by   sodium chloride 0.9% infusion (1,000 mLs Intravenous New Bag 9/3/19 1533)   lidocaine 1 % 0.1-1 mL (has no administration in time range)   lidocaine (LMX4) cream (has no administration in time range)   sodium chloride (PF) 0.9% PF  flush 3 mL (has no administration in time range)   sodium chloride (PF) 0.9% PF flush 3 mL (has no administration in time range)   naloxone (NARCAN) injection 0.1-0.4 mg (has no administration in time range)   melatonin tablet 1 mg (has no administration in time range)   sodium chloride 0.9% infusion (has no administration in time range)   acetaminophen (TYLENOL) tablet 650 mg (has no administration in time range)   ibuprofen (ADVIL/MOTRIN) tablet 600 mg (has no administration in time range)   ondansetron (ZOFRAN-ODT) ODT tab 4 mg (has no administration in time range)     Or   ondansetron (ZOFRAN) injection 4 mg (has no administration in time range)   alum & mag hydroxide-simethicone (MYLANTA ES/MAALOX  ES) suspension 15 mL (has no administration in time range)   cefTRIAXone (ROCEPHIN) 1 g vial to attach to  mL bag for ADULTS or NS 50 mL bag for PEDS (has no administration in time range)   diazepam (VALIUM) tablet 5-20 mg (has no administration in time range)   nicotine (NICORETTE) gum 2 mg (has no administration in time range)   0.9% sodium chloride BOLUS (has no administration in time range)   prochlorperazine (COMPAZINE) injection 10 mg (10 mg Intravenous Given 9/3/19 1431)   acetaminophen (TYLENOL) tablet 1,000 mg (1,000 mg Oral Given 9/3/19 1431)   sodium chloride 0.9 % bag 500mL for CT scan flush use (60 mLs Intravenous Given 9/3/19 1445)   iopamidol (ISOVUE-370) solution 100 mL (80 mLs Intravenous Given 9/3/19 1445)   ketorolac (TORADOL) injection 30 mg (30 mg Intravenous Given 9/3/19 1529)   cefTRIAXone (ROCEPHIN) 1 g vial to attach to  mL bag for ADULTS or NS 50 mL bag for PEDS (1 g Intravenous New Bag 9/3/19 1645)     Results for orders placed or performed during the hospital encounter of 09/03/19 (from the past 24 hour(s))   CBC with platelets differential   Result Value Ref Range    WBC 18.3 (H) 4.0 - 11.0 10e9/L    RBC Count 4.39 (L) 4.4 - 5.9 10e12/L    Hemoglobin 14.5 13.3 - 17.7 g/dL     Hematocrit 43.1 40.0 - 53.0 %    MCV 98 78 - 100 fl    MCH 33.0 26.5 - 33.0 pg    MCHC 33.6 31.5 - 36.5 g/dL    RDW 12.4 10.0 - 15.0 %    Platelet Count 283 150 - 450 10e9/L    Diff Method Automated Method     % Neutrophils 81.4 %    % Lymphocytes 5.7 %    % Monocytes 12.0 %    % Eosinophils 0.0 %    % Basophils 0.1 %    % Immature Granulocytes 0.8 %    Nucleated RBCs 0 0 /100    Absolute Neutrophil 14.9 (H) 1.6 - 8.3 10e9/L    Absolute Lymphocytes 1.1 0.8 - 5.3 10e9/L    Absolute Monocytes 2.2 (H) 0.0 - 1.3 10e9/L    Absolute Eosinophils 0.0 0.0 - 0.7 10e9/L    Absolute Basophils 0.0 0.0 - 0.2 10e9/L    Abs Immature Granulocytes 0.1 0 - 0.4 10e9/L    Absolute Nucleated RBC 0.0    Basic metabolic panel   Result Value Ref Range    Sodium 131 (L) 133 - 144 mmol/L    Potassium 3.6 3.4 - 5.3 mmol/L    Chloride 93 (L) 94 - 109 mmol/L    Carbon Dioxide 30 20 - 32 mmol/L    Anion Gap 8 3 - 14 mmol/L    Glucose 102 (H) 70 - 99 mg/dL    Urea Nitrogen 7 7 - 30 mg/dL    Creatinine 1.14 0.66 - 1.25 mg/dL    GFR Estimate 86 >60 mL/min/[1.73_m2]    GFR Estimate If Black >90 >60 mL/min/[1.73_m2]    Calcium 9.1 8.5 - 10.1 mg/dL   UA with Microscopic   Result Value Ref Range    Color Urine Yellow     Appearance Urine Clear     Glucose Urine Negative NEG^Negative mg/dL    Bilirubin Urine Negative NEG^Negative    Ketones Urine Negative NEG^Negative mg/dL    Specific Gravity Urine 1.005 1.003 - 1.035    Blood Urine Trace (A) NEG^Negative    pH Urine 7.5 (H) 5.0 - 7.0 pH    Protein Albumin Urine 30 (A) NEG^Negative mg/dL    Urobilinogen mg/dL 2.0 0.0 - 2.0 mg/dL    Nitrite Urine Negative NEG^Negative    Leukocyte Esterase Urine Small (A) NEG^Negative    Source Midstream Urine     WBC Urine 11 (H) 0 - 5 /HPF    RBC Urine 1 0 - 2 /HPF   Urine Culture   Result Value Ref Range    Specimen Description Midstream Urine     Special Requests Specimen received in preservative     Culture Micro PENDING    CT Abdomen Pelvis w Contrast    Narrative     CT ABDOMEN AND PELVIS WITH CONTRAST   9/3/2019 2:51 PM     HISTORY: Right lower quadrant abdominal pain. Fever.    TECHNIQUE: CT abdomen and pelvis with 80mL Isovue 370 IV. Radiation  dose for this scan was reduced using automated exposure control,  adjustment of the mA and/or kV according to patient size, or iterative  reconstruction technique.    COMPARISON: None.    FINDINGS: Mild right perirenal stranding. There is also mild  enhancement of the walls of the right renal pelvis. Remainder of the  right renal enhancement appears normal. There is no visible right  ureter stone or hydronephrosis identified. There is mild wall  prominence of the bladder that may just relate to nondistention. Low  position of a left-sided pelvic kidney. No left hydronephrosis.    The gallbladder is contracted. The liver, adrenals, spleen, and  pancreas do not show any acute abnormalities. No free fluid or free  air. No abscess identified. No acute bowel abnormality. Normal  appendix.      Impression    IMPRESSION:  1. Mild right perirenal stranding and some enhancement of the right  renal pelvis walls. Correlate with any evidence for urinary tract  infection. No hydronephrosis or urolithiasis. There is also mild wall  prominence of the urinary bladder that could just relate to  nondistention.  2. Incidental left pelvic kidney.  3. No acute abnormality otherwise seen.    NEGRITA AN MD        Procedures             Labs Ordered and Resulted from Time of ED Arrival Up to the Time of Departure from the ED   CBC WITH PLATELETS DIFFERENTIAL - Abnormal; Notable for the following components:       Result Value    WBC 18.3 (*)     RBC Count 4.39 (*)     Absolute Neutrophil 14.9 (*)     Absolute Monocytes 2.2 (*)     All other components within normal limits   BASIC METABOLIC PANEL - Abnormal; Notable for the following components:    Sodium 131 (*)     Chloride 93 (*)     Glucose 102 (*)     All other components within normal limits    ROUTINE UA WITH MICROSCOPIC - Abnormal; Notable for the following components:    Blood Urine Trace (*)     pH Urine 7.5 (*)     Protein Albumin Urine 30 (*)     Leukocyte Esterase Urine Small (*)     WBC Urine 11 (*)     All other components within normal limits   IP ASSIGN PROVIDER TEAM TO TREATMENT TEAM   VITAL SIGNS   PERIPHERAL IV CATHETER   INTAKE AND OUTPUT   APPLY PNEUMATIC COMPRESSION DEVICE (PCD)   MSSA SCORE AND VS   NOTIFY   URINE CULTURE AEROBIC BACTERIAL   BLOOD CULTURE   BLOOD CULTURE            Assessments & Plan (with Medical Decision Making)   Cheko presents to the Emergency Department with headache.  He was also noted to be febrile and tachycardic here in the Emergency Department.  Of note patient was seen yesterday for urinary symptoms he reports those have largely improved however he does have right lower quadrant tenderness and significant right flank pain on exam.  Initially was concerned about headache as he reports this is a new symptom; reassuringly he does not have evidence of meningismus on exam and no photophobia, however in the setting of fever and flank pain I suspect this is more secondary to systemic illness than a primary neurologic issue.  Patient has a completely normal neurologic exam.      Initial evaluation started with basic laboratory studies, IV fluids, Compazine, Tylenol, and Toradol.  Urine shows some white counts and leukocyte esterase improved from yesterday's, yesterday's culture showed E. coli but no sensitivities yet.  Patient had a white count of 18.3, slight hyponatremia but otherwise normal electrolytes. I did obtain a CT abdomen pelvis that was consistent with right-sided pyelonephritis.  I gave a dose of IV ceftriaxone, 1 L of IV fluids followed by a second liter of normal saline, again with the above Tylenol, Toradol, and Compazine.  On repeat evaluation patient reported he was feeling much better.  Given his fever, white count, and his overall ill appearance in  the setting after having 2 doses of outpatient antibiotics I feel that he is probably safest to come in for IV antibiotics and clinical improvement prior to discharge.  Patient will be admitted to the hospitalist service for IV antibiotics observation and ongoing treatment of his pyelonephritis.    This part of the medical record was transcribed by Thomas Lazaro, Medical Scribe, from a dictation done by Vince Gregory MD.     I have reviewed the nursing notes.    I have reviewed the findings, diagnosis, plan and need for follow up with the patient.    New Prescriptions    No medications on file       Final diagnoses:   Pyelonephritis     I, Thomas Lazaro, am serving as a trained medical scribe to document services personally performed by Vince Gregory MD, based on the provider's statements to me.   I, Vince Gregory MD, was physically present and have reviewed and verified the accuracy of this note documented by Thomas Lazaro.    9/3/2019   [facility], [place], EMERGENCY DEPARTMENT     Vince Gregory MD  09/03/19 2408

## 2019-09-03 NOTE — PROGRESS NOTES
[facility], [place]   ED Nurse to Floor Handoff     Cheko Guidry is a 28 year old male who speaks English and lives with family members,  in a home  They arrived in the ED by car from home    ED Chief Complaint: Headache    ED Dx;   Final diagnoses:   Pyelonephritis         Needed?: No    Allergies: No Known Allergies.  Past Medical Hx:   Past Medical History:   Diagnosis Date     NO ACTIVE PROBLEMS       Baseline Mental status: WDL  Current Mental Status changes: at basesline    Infection present or suspected this encounter: yes other cultures are pending  Sepsis suspected: Yes  Isolation type: No active isolations     Activity level - Baseline/Home:  Independent  Activity Level - Current:   Independent    Bariatric equipment needed?: No    In the ED these meds were given:   Medications   0.9% sodium chloride BOLUS (0 mLs Intravenous Stopped 9/3/19 1532)     Followed by   sodium chloride 0.9% infusion (1,000 mLs Intravenous New Bag 9/3/19 1533)   lidocaine 1 % 0.1-1 mL (has no administration in time range)   lidocaine (LMX4) cream (has no administration in time range)   sodium chloride (PF) 0.9% PF flush 3 mL (has no administration in time range)   sodium chloride (PF) 0.9% PF flush 3 mL (has no administration in time range)   naloxone (NARCAN) injection 0.1-0.4 mg (has no administration in time range)   melatonin tablet 1 mg (has no administration in time range)   sodium chloride 0.9% infusion (has no administration in time range)   acetaminophen (TYLENOL) tablet 650 mg (has no administration in time range)   ibuprofen (ADVIL/MOTRIN) tablet 600 mg (has no administration in time range)   ondansetron (ZOFRAN-ODT) ODT tab 4 mg (has no administration in time range)     Or   ondansetron (ZOFRAN) injection 4 mg (has no administration in time range)   alum & mag hydroxide-simethicone (MYLANTA ES/MAALOX  ES) suspension 15 mL (has no administration in time range)   cefTRIAXone  (ROCEPHIN) 1 g vial to attach to  mL bag for ADULTS or NS 50 mL bag for PEDS (1 g Intravenous New Bag 9/3/19 3873)   diazepam (VALIUM) tablet 5-20 mg (has no administration in time range)   nicotine (NICORETTE) gum 2 mg (has no administration in time range)   0.9% sodium chloride BOLUS (1,000 mLs Intravenous New Bag 9/3/19 1743)   prochlorperazine (COMPAZINE) injection 10 mg (10 mg Intravenous Given 9/3/19 1431)   acetaminophen (TYLENOL) tablet 1,000 mg (1,000 mg Oral Given 9/3/19 1431)   sodium chloride 0.9 % bag 500mL for CT scan flush use (60 mLs Intravenous Given 9/3/19 1445)   iopamidol (ISOVUE-370) solution 100 mL (80 mLs Intravenous Given 9/3/19 1445)   ketorolac (TORADOL) injection 30 mg (30 mg Intravenous Given 9/3/19 1529)   cefTRIAXone (ROCEPHIN) 1 g vial to attach to  mL bag for ADULTS or NS 50 mL bag for PEDS (1 g Intravenous New Bag 9/3/19 8185)       Drips running?  Yes    Home pump  No    Current LDAs  Wound 01/12/18 Left Other (Comment) Laceration (Active)   Number of days: 599       Incision/Surgical Site 09/17/18 Bilateral Scrotum (Active)   Number of days: 351       Labs results:   Labs Ordered and Resulted from Time of ED Arrival Up to the Time of Departure from the ED   CBC WITH PLATELETS DIFFERENTIAL - Abnormal; Notable for the following components:       Result Value    WBC 18.3 (*)     RBC Count 4.39 (*)     Absolute Neutrophil 14.9 (*)     Absolute Monocytes 2.2 (*)     All other components within normal limits   BASIC METABOLIC PANEL - Abnormal; Notable for the following components:    Sodium 131 (*)     Chloride 93 (*)     Glucose 102 (*)     All other components within normal limits   ROUTINE UA WITH MICROSCOPIC - Abnormal; Notable for the following components:    Blood Urine Trace (*)     pH Urine 7.5 (*)     Protein Albumin Urine 30 (*)     Leukocyte Esterase Urine Small (*)     WBC Urine 11 (*)     All other components within normal limits   IP ASSIGN PROVIDER TEAM TO  TREATMENT TEAM   VITAL SIGNS   PERIPHERAL IV CATHETER   INTAKE AND OUTPUT   APPLY PNEUMATIC COMPRESSION DEVICE (PCD)   MSSA SCORE AND VS   NOTIFY   URINE CULTURE AEROBIC BACTERIAL   BLOOD CULTURE   BLOOD CULTURE       Imaging Studies:   Recent Results (from the past 24 hour(s))   CT Abdomen Pelvis w Contrast    Narrative    CT ABDOMEN AND PELVIS WITH CONTRAST   9/3/2019 2:51 PM     HISTORY: Right lower quadrant abdominal pain. Fever.    TECHNIQUE: CT abdomen and pelvis with 80mL Isovue 370 IV. Radiation  dose for this scan was reduced using automated exposure control,  adjustment of the mA and/or kV according to patient size, or iterative  reconstruction technique.    COMPARISON: None.    FINDINGS: Mild right perirenal stranding. There is also mild  enhancement of the walls of the right renal pelvis. Remainder of the  right renal enhancement appears normal. There is no visible right  ureter stone or hydronephrosis identified. There is mild wall  prominence of the bladder that may just relate to nondistention. Low  position of a left-sided pelvic kidney. No left hydronephrosis.    The gallbladder is contracted. The liver, adrenals, spleen, and  pancreas do not show any acute abnormalities. No free fluid or free  air. No abscess identified. No acute bowel abnormality. Normal  appendix.      Impression    IMPRESSION:  1. Mild right perirenal stranding and some enhancement of the right  renal pelvis walls. Correlate with any evidence for urinary tract  infection. No hydronephrosis or urolithiasis. There is also mild wall  prominence of the urinary bladder that could just relate to  nondistention.  2. Incidental left pelvic kidney.  3. No acute abnormality otherwise seen.    NEGRITA AN MD       Recent vital signs:   BP (!) 141/96   Pulse 117   Temp 98.5  F (36.9  C) (Oral)   Resp 16   Wt 74.4 kg (164 lb)   SpO2 96%   BMI 22.24 kg/m              Cardiac Rhythm: Normal Sinus  Pt needs tele? No  Skin/wound Issues:  None    Code Status: Full Code    Pain control: fair    Nausea control: pt had none    Abnormal labs/tests/findings requiring intervention: see labs    Family present during ED course? No   Family Comments/Social Situation comments:     Tasks needing completion: None    Russell Hayes RN  [facility] -- [phone] [place] ED  [phone] [facility]

## 2019-09-03 NOTE — H&P
Admitted:     09/03/2019      REASON FOR HOSPITAL ADMISSION:  Fever, right flank pain for the last 5 days, headache for 5 days.      HISTORY OF PRESENT ILLNESS:  The patient is a 28-year-old healthy male was doing okay up until 5 days ago when he noticed decreased urination associated with some headache.  Subsequently, he was just not feeling well and continued to have on and off headaches.  Initially, he had difficulty voiding urine when the urine was not coming out, but subsequently he started having frequency with urination and he was peeing every 10-15 minutes.  He reports some discomfort with urination as well.  He had some pain in his suprapubic region and then started having pain in his lower back and then it localized in his right flank region.  The patient was seen in the urgent care on 09/02 and he had a urinalysis done which showed a urinary tract infection with pyuria of  per high power field.  Urine culture is growing E. coli, susceptibilities are pending.  He was given ciprofloxacin, which he has taken for 2 doses.  He was still not feeling well, hence he came to the ER for further evaluation.      On arrival in the ED, his temperature was 101.2 degrees Fahrenheit, heart rate was 138 beats per minute, blood pressure was high at 164/103, respiratory rate of 16, he was 97% on room air.  The patient was diaphoretic and looked sick, hence he has been hospitalized for further evaluation and treatment.      CT scan of the abdomen and pelvis done in the ED showed right perinephric stranding and enhancement of the right renal pelvic wall suggestive of urinary tract infection.  There is an incidental left pelvic kidney.  In urgent care, he had an x-ray done.  There were some pelvic stones noted, unclear if they were phleboliths or real kidney stones.  No evidence of kidney stones found on the CAT scan today.      On further inquiry, the patient reported that he had some hematuria in the beginning, which  has now disappeared.  He has been  for a long time.  He reports sexual exposure with his wife.  No ex-marital sexual exposure.  No anal intercourse.  No history of prior urinary tract infection.  There is no history of scrotal pain, swelling or enlargement.  There is no genital ulceration.      PAST MEDICAL HISTORY:  Unremarkable.      MEDICATIONS:  None at home.      SOCIAL HISTORY:  The patient smokes 1/2-1 pack per day, drinks 6 beers daily.  He is , lives with his wife, unprotected sexual exposure with his wife.  No sexual exposure beyond wife.  No history of substance abuse.      FAMILY HISTORY:  Not contributory for urinary tract infection.      REVIEW OF SYSTEMS:  Ten-point review of systems is obtained and noted in the HPI, otherwise negative.      PHYSICAL EXAMINATION:   GENERAL:  The patient is awake, alert, oriented, follows commands.   VITAL SIGNS:  T-max 101.2 degrees Fahrenheit, pulse 138 beats per minute, blood pressure 141/96, respiratory rate 16, 96% on room air.   HEENT:  No evidence of trauma.  Pupils are equal and respond to light.  Oropharynx is moist,.   NECK:  Supple.  There are no lymph nodes.   RESPIRATORY:  Air entry is good on both sides.  No wheezing or crackles.   CARDIOVASCULAR:  S1, S2 normal.  There are no murmurs or gallops.   ABDOMEN:  Soft.  Right flank tenderness noted on palpation.  Right CVA tenderness noted as well.  The patient has mild left lower quadrant tenderness on examination as well.  No organomegaly or fluid in the belly.     EXTREMITIES:  Bilateral lower extremities with no calf tenderness, no edema.   SKIN:  Appears normal.   NEUROLOGIC:  Nonfocal.   NEUROPSYCHIATRIC:  Normal.      INVESTIGATIONS:  Chemistry panel:  Sodium 131, potassium 3.6, chloride 90, bicarbonate 30, BUN 7, creatinine 1.1, calcium 9.1, anion gap 8, glucose 102.  CBC:  Hemoglobin 14.5, WBC count 18.3, platelet count is 283 with 81% neutrophils, 5.7% lymphocytes.  UA done again today  showed pyuria, trace hematuria.  Blood culture is ordered, pending.      IMAGING:  CT scan of the abdomen showed inferior to the left pelvic kidney there is right perinephric stranding and some enhancement of the right renal pelvis wall noted as well.  No kidney stones noted.      ASSESSMENT AND PLAN:  A 28-year-old healthy male who came in with dysuria, frequency, hematuria, right flank pain, headache and fever.  He was septic on admission to the ED.  CT scan of the abdomen and pelvis done shows right perinephric stranding and pelvic wall enhancement.  He has incidental finding of pelvic kidney on the left side.   1.  Sepsis due to acute right pyelonephritis.   2.  Acute right pyelonephritis due to E. coli, susceptibility still pending.   3.  Incidental finding of pelvic kidney on the left side.   4.  Hematuria, likely due to UTI.  It is possible that he may have passed a stone as well.     5.  Hyponatremia, sodium 131 on admission.      It is unclear what triggered his urinary tract infection.  It is possible that he may have had a stone and it is now gone.  We will restart him on Rocephin daily for his urinary tract infection.  Based on the susceptibility of the E. coli, one could change antibiotics accordingly.  Given his sepsis, we will give him 3 mL per kg fluid bolus and then continue normal saline at 125 mL an hour.  Headaches are likely due to fever and overall illness.  Supportive care for that.  Hyponatremia is mild and expected to improve with saline.  Recheck tomorrow.  The patient is to be in the hospital for 1-2 days and once his fever and sepsis improves, he can be discharged.      6.  Tobacco use disorder.  Smokes 1/2 to 1 pack per day.  Offered nicotine patches, but he declined to use them.  Recommend tobacco cessation.   7.  Alcohol use.  He drinks a 7-pack every day.  He has been tapering his alcohol.  Last drink was yesterday when he had 2 shots to prevent alcohol withdrawal and he was also  having headaches as well.  The patient will be started on MSSA protocol.  Recommend alcohol cessation.      ADMISSION STATUS:  Inpatient, needs at least 2 days of hospitalization given the sepsis from urinary tract infection.         TAHIR SINGH MD             D: 2019   T: 2019   MT: GEOFF      Name:     PAUL LOVETT   MRN:      [id number]        Account:      [id number]   :      1990        Admitted:     2019                   Document: [id number]

## 2019-09-03 NOTE — TELEPHONE ENCOUNTER
"S-(situation): spoke with wife of patient and she reports that patient has had a severe headache x 6 days that is continuous.    B-(background): patient was see in urgent care yesterday for dysuria and headache    A-(assessment): patient has \"worst headache ever\" and is continuous. Wife reports that patient sometimes yells out in pain with how it feels. Patient is negative for following symptoms: weakness, unsteady gait, numbness/tingling, blurred vision, slurred speech, vomiting, confusion.    R-(recommendations): to ED now with . Wife will drive him there now.     Christine Coulter RN      "

## 2019-09-03 NOTE — ED TRIAGE NOTES
Pt states a one week hx of a shooting HA.  Pt states he was seen at  yesterday and diag with a UTI and given ASA for his HA and states the HA is not getting any better.

## 2019-09-03 NOTE — PHARMACY-ADMISSION MEDICATION HISTORY
Admission medication history interview status for the 9/3/2019 admission is complete. See Epic admission navigator for allergy information, pharmacy, prior to admission medications and immunization status.     Medication history interview sources:  Patient, E-Prescriptions, Chart Review    Changes made to PTA medication list (reason)  Added: ibuprofen, acetaminophen  Deleted: aspirin (patient initially said he takes it PRN then mentioned it was ibuprofen), Tylenol PM, Norco  Changed: n/a    Additional medication history information (including reliability of information, actions taken by pharmacist): Medication history completed after speaking with patient at bedside, who was a reliable historian.     - Patient has used three doses of Excedrin Migraine without relief.   - Patient has taken two doses of Cipro so far (started 9/2/19 for UTI)  - Patient has taken one dose of Tamsulosin (started with UTI diagnosis)      Prior to Admission medications    Medication Sig Last Dose Taking? Auth Provider   acetaminophen (TYLENOL) 500 MG tablet Take 500 mg by mouth every 6 hours as needed for mild pain PRN Yes Unknown, Entered By History   aspirin-acetaminophen-caffeine (EXCEDRIN MIGRAINE) 250-250-65 MG tablet One tab every 8 hr's/ as needed for headache. 9/3/2019 at 0100 Yes Sher Madrigal MD   ciprofloxacin (CIPRO) 500 MG tablet Take 1 tablet (500 mg) by mouth 2 times daily 9/3/2019 at am Yes Sher Madrigal MD   ibuprofen (ADVIL/MOTRIN) 200 MG tablet Take 400 mg by mouth every 6 hours as needed for mild pain PRN Yes Unknown, Entered By History   tamsulosin (FLOMAX) 0.4 MG capsule Take 1 capsule (0.4 mg) by mouth At Bedtime 9/2/2019 at hs Yes Sher Madrigal MD         Medication history completed by: Robbie Mcbride, PharmD

## 2019-09-04 LAB
ANION GAP SERPL CALCULATED.3IONS-SCNC: 6 MMOL/L (ref 3–14)
BACTERIA SPEC CULT: ABNORMAL
BACTERIA SPEC CULT: NO GROWTH
BUN SERPL-MCNC: 6 MG/DL (ref 7–30)
CALCIUM SERPL-MCNC: 9 MG/DL (ref 8.5–10.1)
CHLORIDE SERPL-SCNC: 105 MMOL/L (ref 94–109)
CO2 SERPL-SCNC: 28 MMOL/L (ref 20–32)
CREAT SERPL-MCNC: 0.92 MG/DL (ref 0.66–1.25)
CRP SERPL-MCNC: 180 MG/L (ref 0–8)
ERYTHROCYTE [DISTWIDTH] IN BLOOD BY AUTOMATED COUNT: 12.4 % (ref 10–15)
GFR SERPL CREATININE-BSD FRML MDRD: >90 ML/MIN/{1.73_M2}
GLUCOSE SERPL-MCNC: 109 MG/DL (ref 70–99)
HCT VFR BLD AUTO: 40.4 % (ref 40–53)
HGB BLD-MCNC: 13.1 G/DL (ref 13.3–17.7)
Lab: NORMAL
MCH RBC QN AUTO: 32.2 PG (ref 26.5–33)
MCHC RBC AUTO-ENTMCNC: 32.4 G/DL (ref 31.5–36.5)
MCV RBC AUTO: 99 FL (ref 78–100)
PLATELET # BLD AUTO: 256 10E9/L (ref 150–450)
POTASSIUM SERPL-SCNC: 4.4 MMOL/L (ref 3.4–5.3)
RBC # BLD AUTO: 4.07 10E12/L (ref 4.4–5.9)
SODIUM SERPL-SCNC: 139 MMOL/L (ref 133–144)
SPECIMEN SOURCE: ABNORMAL
SPECIMEN SOURCE: NORMAL
TSH SERPL DL<=0.005 MIU/L-ACNC: 1.55 MU/L (ref 0.4–4)
WBC # BLD AUTO: 10.6 10E9/L (ref 4–11)
WBC # BLD AUTO: 10.8 10E9/L (ref 4–11)

## 2019-09-04 PROCEDURE — 80048 BASIC METABOLIC PNL TOTAL CA: CPT | Performed by: HOSPITALIST

## 2019-09-04 PROCEDURE — 85048 AUTOMATED LEUKOCYTE COUNT: CPT | Performed by: INTERNAL MEDICINE

## 2019-09-04 PROCEDURE — 25000132 ZZH RX MED GY IP 250 OP 250 PS 637: Performed by: HOSPITALIST

## 2019-09-04 PROCEDURE — 25800030 ZZH RX IP 258 OP 636: Performed by: INTERNAL MEDICINE

## 2019-09-04 PROCEDURE — 36415 COLL VENOUS BLD VENIPUNCTURE: CPT | Performed by: HOSPITALIST

## 2019-09-04 PROCEDURE — 99207 ZZC CDG-MDM COMPONENT: MEETS LOW - DOWN CODED: CPT | Performed by: INTERNAL MEDICINE

## 2019-09-04 PROCEDURE — 84443 ASSAY THYROID STIM HORMONE: CPT | Performed by: HOSPITALIST

## 2019-09-04 PROCEDURE — 25000132 ZZH RX MED GY IP 250 OP 250 PS 637: Performed by: INTERNAL MEDICINE

## 2019-09-04 PROCEDURE — 12000001 ZZH R&B MED SURG/OB UMMC

## 2019-09-04 PROCEDURE — 25000128 H RX IP 250 OP 636: Performed by: INTERNAL MEDICINE

## 2019-09-04 PROCEDURE — 36415 COLL VENOUS BLD VENIPUNCTURE: CPT | Performed by: INTERNAL MEDICINE

## 2019-09-04 PROCEDURE — 85027 COMPLETE CBC AUTOMATED: CPT | Performed by: HOSPITALIST

## 2019-09-04 PROCEDURE — 86140 C-REACTIVE PROTEIN: CPT | Performed by: HOSPITALIST

## 2019-09-04 PROCEDURE — 99232 SBSQ HOSP IP/OBS MODERATE 35: CPT | Performed by: INTERNAL MEDICINE

## 2019-09-04 RX ORDER — CEFTRIAXONE 1 G/1
1 INJECTION, POWDER, FOR SOLUTION INTRAMUSCULAR; INTRAVENOUS ONCE
Status: COMPLETED | OUTPATIENT
Start: 2019-09-04 | End: 2019-09-04

## 2019-09-04 RX ORDER — AMLODIPINE BESYLATE 2.5 MG/1
2.5 TABLET ORAL DAILY
Qty: 30 TABLET | Refills: 0 | Status: SHIPPED | OUTPATIENT
Start: 2019-09-05 | End: 2019-09-05

## 2019-09-04 RX ORDER — L. ACIDOPHILUS/L.BULGARICUS 1MM CELL
1 TABLET ORAL 2 TIMES DAILY
Qty: 20 TABLET | Refills: 0 | Status: SHIPPED | OUTPATIENT
Start: 2019-09-04 | End: 2019-09-05

## 2019-09-04 RX ORDER — AMLODIPINE BESYLATE 2.5 MG/1
2.5 TABLET ORAL DAILY
Status: DISCONTINUED | OUTPATIENT
Start: 2019-09-04 | End: 2019-09-04

## 2019-09-04 RX ORDER — AMLODIPINE BESYLATE 2.5 MG/1
2.5 TABLET ORAL DAILY
Qty: 30 TABLET | Refills: 0 | Status: SHIPPED | OUTPATIENT
Start: 2019-09-05 | End: 2019-09-04

## 2019-09-04 RX ADMIN — SODIUM CHLORIDE, POTASSIUM CHLORIDE, SODIUM LACTATE AND CALCIUM CHLORIDE 500 ML: 600; 310; 30; 20 INJECTION, SOLUTION INTRAVENOUS at 16:49

## 2019-09-04 RX ADMIN — AMLODIPINE BESYLATE 2.5 MG: 2.5 TABLET ORAL at 14:11

## 2019-09-04 RX ADMIN — CEFTRIAXONE SODIUM 1 G: 1 INJECTION, POWDER, FOR SOLUTION INTRAMUSCULAR; INTRAVENOUS at 16:49

## 2019-09-04 RX ADMIN — IBUPROFEN 600 MG: 600 TABLET ORAL at 01:43

## 2019-09-04 ASSESSMENT — ACTIVITIES OF DAILY LIVING (ADL)
ADLS_ACUITY_SCORE: 14

## 2019-09-04 NOTE — PLAN OF CARE
VS:   BP (!) 154/109 (BP Location: Left arm)   Pulse 108   Temp 97.2  F (36.2  C) (Oral)   Resp 16   Wt 74.4 kg (164 lb)   SpO2 96%   BMI 22.24 kg/m    LS clear denies SOB/CP   Output:   Voiding spontaneously no hematuria noted, last BM 9/2   Activity:   Independent pt goes out to smoke teaching completed   Skin: intact   Pain:   Complaints of HA tylenol given x1   Neuro/CMS:   Intact denies n/t   Dressing(s):   none   Diet:   reg   LDA:   PIV /hr   Equipment:   Call light within reach   Plan:   Antibiotics and further evaluation tomorrow   Additional Info:

## 2019-09-04 NOTE — PROGRESS NOTES
Care Coordinator - Discharge Planning    Admission Date/Time:  9/3/2019  Attending MD:  Kathryn Funez MD     Data  Date of initial CC assessment: 9/4/19  Chart reviewed, discussed with interdisciplinary team.   Patient was admitted for:   1. Elevated blood pressure reading without diagnosis of hypertension    2. Pyelonephritis      Assessment   Concerns with insurance coverage for discharge needs: None.  Current Living Situation: Patient lives with family.  Support System: Supportive and Involved  Services Involved: None PTA  Transportation at Discharge: Car and Family or friend will provide  Transportation to Medical Appointments:    - Name of caregiver: wifePark  Barriers to Discharge: none    Coordination of Care and Referrals: Provided patient/family with options for PCP follow up.  Per MD team, patient will be ready for discharge later today. Dr. Renee requests a follow up for patient at [facility]. Patient does not have referring physician listed. His most recent visits were at the [facility].  I met with patient to discuss. He reports that when he needs to, he goes to the [facility]. Per chart review, he had a pre-op physical at [place] 1 year ago. Was able to secure appointment as follows:  Sep 11, 2019  3:00 PM CDT  Office Visit with Marlon More MD  [facility] ([facility] [address] [address] [phone]   Provided patient with this appointment information.    Plan  Anticipated Discharge Date:  Today  Anticipated Discharge Plan:  Home with PCP follow up    José Luis Hathaway RN Care Coordinator [phone]

## 2019-09-04 NOTE — PROGRESS NOTES
[facility] Internal Medicine Progress Note            Interval History:   Record reviewed including admission note.  Seen earlier with RN.  On rocephin 1 gram IV q 24 hrs.  Interval clinical improvement.  No chills.  Resolution of back/flank and supra pubic discomfort.  No dysuria.  Improved urinary stream.  Up in room without LH.  HA LN resolved.  No CP, SOB, cough, nausea, reflux, abd pain or distention.   Soft BM this AM.  No h/o stone disease.  Issue with persistent BP elevation PTA without treatment intervention.  Indicates has to leave today (alleges will sign out) as wife has to work 9/5 and 3 children at home requiring .           Medications:   All medications reviewed today          Physical Exam:   Blood pressure (!) 150/95, pulse 100, temperature 95.1  F (35.1  C), temperature source Axillary, resp. rate 16, weight 74.4 kg (164 lb), SpO2 99 %.  No intake or output data in the 24 hours ending 09/04/19 1328    General:  Alert.  Appropriate.  No distress.  No O2.     Heent:      Neck:    Skin:    Chest:  clear    Cardiac:  Reg without gallop, murmur.  No JVD.     Abdomen:  Non distended, soft, non-tender.  BS normal.  Rectal - prostate non tender.     Extremities:  Perfused.  No edema, calf, posterior thigh tenderness to suggest DVT.  No present CVA or flank tenderness with percussion.     Neuro:            Data:     Results for orders placed or performed during the hospital encounter of 09/03/19 (from the past 24 hour(s))   CBC with platelets differential   Result Value Ref Range    WBC 18.3 (H) 4.0 - 11.0 10e9/L    RBC Count 4.39 (L) 4.4 - 5.9 10e12/L    Hemoglobin 14.5 13.3 - 17.7 g/dL    Hematocrit 43.1 40.0 - 53.0 %    MCV 98 78 - 100 fl    MCH 33.0 26.5 - 33.0 pg    MCHC 33.6 31.5 - 36.5 g/dL    RDW 12.4 10.0 - 15.0 %    Platelet Count 283 150 - 450 10e9/L    Diff Method Automated Method     % Neutrophils 81.4 %    % Lymphocytes 5.7 %    % Monocytes 12.0 %    % Eosinophils 0.0 %    %  Basophils 0.1 %    % Immature Granulocytes 0.8 %    Nucleated RBCs 0 0 /100    Absolute Neutrophil 14.9 (H) 1.6 - 8.3 10e9/L    Absolute Lymphocytes 1.1 0.8 - 5.3 10e9/L    Absolute Monocytes 2.2 (H) 0.0 - 1.3 10e9/L    Absolute Eosinophils 0.0 0.0 - 0.7 10e9/L    Absolute Basophils 0.0 0.0 - 0.2 10e9/L    Abs Immature Granulocytes 0.1 0 - 0.4 10e9/L    Absolute Nucleated RBC 0.0    Basic metabolic panel   Result Value Ref Range    Sodium 131 (L) 133 - 144 mmol/L    Potassium 3.6 3.4 - 5.3 mmol/L    Chloride 93 (L) 94 - 109 mmol/L    Carbon Dioxide 30 20 - 32 mmol/L    Anion Gap 8 3 - 14 mmol/L    Glucose 102 (H) 70 - 99 mg/dL    Urea Nitrogen 7 7 - 30 mg/dL    Creatinine 1.14 0.66 - 1.25 mg/dL    GFR Estimate 86 >60 mL/min/[1.73_m2]    GFR Estimate If Black >90 >60 mL/min/[1.73_m2]    Calcium 9.1 8.5 - 10.1 mg/dL   UA with Microscopic   Result Value Ref Range    Color Urine Yellow     Appearance Urine Clear     Glucose Urine Negative NEG^Negative mg/dL    Bilirubin Urine Negative NEG^Negative    Ketones Urine Negative NEG^Negative mg/dL    Specific Gravity Urine 1.005 1.003 - 1.035    Blood Urine Trace (A) NEG^Negative    pH Urine 7.5 (H) 5.0 - 7.0 pH    Protein Albumin Urine 30 (A) NEG^Negative mg/dL    Urobilinogen mg/dL 2.0 0.0 - 2.0 mg/dL    Nitrite Urine Negative NEG^Negative    Leukocyte Esterase Urine Small (A) NEG^Negative    Source Midstream Urine     WBC Urine 11 (H) 0 - 5 /HPF    RBC Urine 1 0 - 2 /HPF   Urine Culture   Result Value Ref Range    Specimen Description Midstream Urine     Special Requests Specimen received in preservative     Culture Micro Culture negative < 24 hours, reincubate    CT Abdomen Pelvis w Contrast    Narrative    CT ABDOMEN AND PELVIS WITH CONTRAST   9/3/2019 2:51 PM     HISTORY: Right lower quadrant abdominal pain. Fever.    TECHNIQUE: CT abdomen and pelvis with 80mL Isovue 370 IV. Radiation  dose for this scan was reduced using automated exposure control,  adjustment of the  mA and/or kV according to patient size, or iterative  reconstruction technique.    COMPARISON: None.    FINDINGS: Mild right perirenal stranding. There is also mild  enhancement of the walls of the right renal pelvis. Remainder of the  right renal enhancement appears normal. There is no visible right  ureter stone or hydronephrosis identified. There is mild wall  prominence of the bladder that may just relate to nondistention. Low  position of a left-sided pelvic kidney. No left hydronephrosis.    The gallbladder is contracted. The liver, adrenals, spleen, and  pancreas do not show any acute abnormalities. No free fluid or free  air. No abscess identified. No acute bowel abnormality. Normal  appendix.      Impression    IMPRESSION:  1. Mild right perirenal stranding and some enhancement of the right  renal pelvis walls. Correlate with any evidence for urinary tract  infection. No hydronephrosis or urolithiasis. There is also mild wall  prominence of the urinary bladder that could just relate to  nondistention.  2. Incidental left pelvic kidney.  3. No acute abnormality otherwise seen.    NEGRITA AN MD   Blood culture   Result Value Ref Range    Specimen Description Blood Right Arm     Special Requests Aerobic and anaerobic bottles received     Culture Micro No growth after 13 hours    Blood culture   Result Value Ref Range    Specimen Description Blood Right Hand     Special Requests Aerobic and anaerobic bottles received     Culture Micro No growth after 13 hours    Lactic acid level STAT for sepsis protocol   Result Value Ref Range    Lactate for Sepsis Protocol 1.0 0.7 - 2.0 mmol/L   Basic metabolic panel   Result Value Ref Range    Sodium 139 133 - 144 mmol/L    Potassium 4.4 3.4 - 5.3 mmol/L    Chloride 105 94 - 109 mmol/L    Carbon Dioxide 28 20 - 32 mmol/L    Anion Gap 6 3 - 14 mmol/L    Glucose 109 (H) 70 - 99 mg/dL    Urea Nitrogen 6 (L) 7 - 30 mg/dL    Creatinine 0.92 0.66 - 1.25 mg/dL    GFR Estimate >90  ">60 mL/min/[1.73_m2]    GFR Estimate If Black >90 >60 mL/min/[1.73_m2]    Calcium 9.0 8.5 - 10.1 mg/dL   CBC with platelets   Result Value Ref Range    WBC 10.8 4.0 - 11.0 10e9/L    RBC Count 4.07 (L) 4.4 - 5.9 10e12/L    Hemoglobin 13.1 (L) 13.3 - 17.7 g/dL    Hematocrit 40.4 40.0 - 53.0 %    MCV 99 78 - 100 fl    MCH 32.2 26.5 - 33.0 pg    MCHC 32.4 31.5 - 36.5 g/dL    RDW 12.4 10.0 - 15.0 %    Platelet Count 256 150 - 450 10e9/L              Assessment and Plan:   1)  Complicated UTI with right pyelonephritis.  Clinically doing well.  E. Coli on UC 9/2.  Sensitivities pending.  No known calculus disease unless passed stone.  No clinical suggestion of prostatitis.  PVR 60s.   Reviewed with urology - recommend treat UTI without further intervention presently.   2)  BP elevation c/w possible essential HTN.   3)  Hyponatremia potential relation to decrease volume, resolved.  4)  Alcohol use disorder - 6 beer/day - last drink 8/30 - no features of WD (no h/o WD, DTs, seizures).  Declines CD intervention with \"plan to quit\".    PLAN:  1)  Attempt to retrieve sensitivities on E. Coli before discharge.  If not back with patient plan to leave would continue cipro additional 7 days for approx 10 day course.  Push fluids.  2)  Start amlodipine 2.5 mg daily.  3)  Close PMD follow up within the week.   Disposition Plan   Expected discharge today to prior living arrangement     Entered: Jose De Jesus Renee 09/04/2019, 1:28 PM     Add:  Recurrent fever.  WBC 10.6.  Continue IV rocephin.  BP remains elevated.  Persistent tachycardia.   Add lopressor 12.5 mg BID.  Monitor pulmonary status with history of asthma during childhood.  AM labs.  Add TFTs.         Attestation:  I have reviewed today's vital signs, notes, medications, labs and imaging.     Jose De Jesus Renee MD          "

## 2019-09-04 NOTE — PLAN OF CARE
Pt A/O x 4. Independently ambulating in hallway and room. No report of head ache or flank pain. Gave ibuprofen for temp of 99.9. Blood pressure improved ,125/83. Call light is in reach. Cont to assess.

## 2019-09-04 NOTE — PLAN OF CARE
VS:   BP (!) 150/95 (BP Location: Right arm)   Pulse 100   Temp 95.1  F (35.1  C) (Axillary)   Resp 16   Wt 74.4 kg (164 lb)   SpO2 99%   BMI 22.24 kg/m    BP high, MD aware, starting on amlodipine   Output:   Voiding well, not retaining.   Lungs Lung sounds clear   Activity:   Up independently   Skin: WDL   Pain:   denies   Neuro/CMS:   CMS intact, denies N/T. A&O x4.   Dressing(s):   None present   Diet:   Regular, tolerating well   LDA:   PIV saline locked   Equipment:   Call light and television in use   Plan:   Discharging today because of family concerns, after IV antibiotic.   Additional Info:   Pt needs discharge pharmacy changed to  his discharge meds, Paged MD to notify

## 2019-09-05 ENCOUNTER — PATIENT OUTREACH (OUTPATIENT)
Dept: CARE COORDINATION | Facility: CLINIC | Age: 29
End: 2019-09-05

## 2019-09-05 VITALS
BODY MASS INDEX: 22.24 KG/M2 | DIASTOLIC BLOOD PRESSURE: 95 MMHG | WEIGHT: 164 LBS | TEMPERATURE: 99 F | RESPIRATION RATE: 16 BRPM | SYSTOLIC BLOOD PRESSURE: 139 MMHG | HEART RATE: 113 BPM | OXYGEN SATURATION: 98 %

## 2019-09-05 LAB
ANION GAP SERPL CALCULATED.3IONS-SCNC: 8 MMOL/L (ref 3–14)
BUN SERPL-MCNC: 8 MG/DL (ref 7–30)
CALCIUM SERPL-MCNC: 8.9 MG/DL (ref 8.5–10.1)
CHLORIDE SERPL-SCNC: 102 MMOL/L (ref 94–109)
CO2 SERPL-SCNC: 27 MMOL/L (ref 20–32)
CREAT SERPL-MCNC: 0.87 MG/DL (ref 0.66–1.25)
CRP SERPL-MCNC: 123 MG/L (ref 0–8)
ERYTHROCYTE [DISTWIDTH] IN BLOOD BY AUTOMATED COUNT: 12.5 % (ref 10–15)
GFR SERPL CREATININE-BSD FRML MDRD: >90 ML/MIN/{1.73_M2}
GLUCOSE SERPL-MCNC: 94 MG/DL (ref 70–99)
HCT VFR BLD AUTO: 40.9 % (ref 40–53)
HGB BLD-MCNC: 13.4 G/DL (ref 13.3–17.7)
LACTATE BLD-SCNC: 1.2 MMOL/L (ref 0.7–2)
LACTATE BLD-SCNC: 1.7 MMOL/L (ref 0.7–2)
LACTATE BLD-SCNC: 2.3 MMOL/L (ref 0.7–2)
MCH RBC QN AUTO: 32.1 PG (ref 26.5–33)
MCHC RBC AUTO-ENTMCNC: 32.8 G/DL (ref 31.5–36.5)
MCV RBC AUTO: 98 FL (ref 78–100)
PLATELET # BLD AUTO: 292 10E9/L (ref 150–450)
POTASSIUM SERPL-SCNC: 4 MMOL/L (ref 3.4–5.3)
RBC # BLD AUTO: 4.17 10E12/L (ref 4.4–5.9)
SODIUM SERPL-SCNC: 137 MMOL/L (ref 133–144)
WBC # BLD AUTO: 12.1 10E9/L (ref 4–11)

## 2019-09-05 PROCEDURE — 85027 COMPLETE CBC AUTOMATED: CPT | Performed by: INTERNAL MEDICINE

## 2019-09-05 PROCEDURE — 80048 BASIC METABOLIC PNL TOTAL CA: CPT | Performed by: INTERNAL MEDICINE

## 2019-09-05 PROCEDURE — 99239 HOSP IP/OBS DSCHRG MGMT >30: CPT | Performed by: INTERNAL MEDICINE

## 2019-09-05 PROCEDURE — 86140 C-REACTIVE PROTEIN: CPT | Performed by: INTERNAL MEDICINE

## 2019-09-05 PROCEDURE — 25000132 ZZH RX MED GY IP 250 OP 250 PS 637: Performed by: HOSPITALIST

## 2019-09-05 PROCEDURE — 25000132 ZZH RX MED GY IP 250 OP 250 PS 637: Performed by: INTERNAL MEDICINE

## 2019-09-05 PROCEDURE — 83605 ASSAY OF LACTIC ACID: CPT | Performed by: INTERNAL MEDICINE

## 2019-09-05 PROCEDURE — 25000128 H RX IP 250 OP 636: Performed by: INTERNAL MEDICINE

## 2019-09-05 PROCEDURE — 25800030 ZZH RX IP 258 OP 636: Performed by: INTERNAL MEDICINE

## 2019-09-05 PROCEDURE — 36415 COLL VENOUS BLD VENIPUNCTURE: CPT | Performed by: INTERNAL MEDICINE

## 2019-09-05 RX ORDER — L. ACIDOPHILUS/L.BULGARICUS 1MM CELL
1 TABLET ORAL 2 TIMES DAILY
Qty: 20 TABLET | Refills: 0 | Status: SHIPPED | OUTPATIENT
Start: 2019-09-05

## 2019-09-05 RX ORDER — SODIUM CHLORIDE 9 MG/ML
INJECTION, SOLUTION INTRAVENOUS
Status: DISCONTINUED
Start: 2019-09-05 | End: 2019-09-05 | Stop reason: HOSPADM

## 2019-09-05 RX ORDER — CEFTRIAXONE 1 G/1
1 INJECTION, POWDER, FOR SOLUTION INTRAMUSCULAR; INTRAVENOUS ONCE
Status: COMPLETED | OUTPATIENT
Start: 2019-09-05 | End: 2019-09-05

## 2019-09-05 RX ORDER — METOPROLOL TARTRATE 25 MG/1
12.5 TABLET, FILM COATED ORAL 2 TIMES DAILY
Qty: 30 TABLET | Refills: 0 | Status: SHIPPED | OUTPATIENT
Start: 2019-09-05 | End: 2020-12-23

## 2019-09-05 RX ADMIN — CEFTRIAXONE SODIUM 1 G: 1 INJECTION, POWDER, FOR SOLUTION INTRAMUSCULAR; INTRAVENOUS at 15:54

## 2019-09-05 RX ADMIN — Medication 12.5 MG: at 09:35

## 2019-09-05 RX ADMIN — ACETAMINOPHEN 650 MG: 325 TABLET ORAL at 00:06

## 2019-09-05 RX ADMIN — SODIUM CHLORIDE, POTASSIUM CHLORIDE, SODIUM LACTATE AND CALCIUM CHLORIDE 500 ML: 600; 310; 30; 20 INJECTION, SOLUTION INTRAVENOUS at 11:13

## 2019-09-05 ASSESSMENT — ACTIVITIES OF DAILY LIVING (ADL)
ADLS_ACUITY_SCORE: 14

## 2019-09-05 ASSESSMENT — PAIN DESCRIPTION - DESCRIPTORS
DESCRIPTORS: ACHING;DISCOMFORT
DESCRIPTORS: ACHING;DISCOMFORT;DULL

## 2019-09-05 NOTE — PROGRESS NOTES
[facility], [place]    Sepsis Evaluation Progress Note    Date of Service: 09/05/2019    I was called to see Cheko Guidry due to abnormal vital signs triggering the Sepsis SIRS screening alert. He is known to have an infection.     Physical Exam    Vital Signs:  Temp: 98  F (36.7  C) Temp src: Oral BP: (!) 146/99 Pulse: 108   Resp: 18 SpO2: 99 % O2 Device: None (Room air)      Lab:  Lactate for Sepsis Protocol   Date Value Ref Range Status   09/05/2019 2.3 (H) 0.7 - 2.0 mmol/L Final     Comment:     Significant value called to and read back by  YOUSIF SKAGGS RN 9.5.19 @ 1018 BY HO         The patient is at baseline mental status.    The rest of their physical exam is significant for NAD. Stable VS.  Tachycardia improved.  Chest - clear  Cor - reg without gallop, murmur.  Abd - benign.  No CVA tenderness.  Well p[erfuse.     Assessment and Plan    The SIRS and exam findings are likely due to potential decrease volume, there is no sign of sepsis at this time.    Disposition: The patient will remain on the current unit. We will continue to monitor this patient closely.    Jose De Jesus Renee MD

## 2019-09-05 NOTE — PLAN OF CARE
Pt plan at start of writers shift was to complete one dose of IV antibiotics and LR bolus then leave AMA (after discussing discharge with Dr. Renee). After receiving meds vitals were Temp: 100.6  F (38.1  C) Temp src: Oral BP: (!) 156/98 Pulse: 112   Resp: 18 SpO2: 98 % O2 Device: None (Room air)  . Pt expressed some reluctance about leaving at this time. Educated pt on risks of leaving AMA and pt ultimately decided to stay in hospital. Dr. Renee notified.

## 2019-09-05 NOTE — PLAN OF CARE
Pt up ad terence and going outside to smoke. Cooperative in his cares. Pt c/o lower right sided abd tenderness . Pt states pain is improving. Abd soft. BS+. Pt refused pain medications. Pt triggered  lactic acid protocol.Lactic acid 2.3. Dr. Renee notified. Fluid bolus 500 ml given.  Lactic rechecked  1.7 and then 1.2.  IV antibiotic given.  Discharge instructions reviewed with patient. Filled home medications given to patient with instructions. Follow up with primary care provider on Sept 11. Pt states he understands instructions. Pt ambulated to [facility] with all belongings.. Refused . Wife will  patient.

## 2019-09-05 NOTE — PLAN OF CARE
BP (!) 143/99 (BP Location: Left arm)   Pulse 92   Temp 96.8  F (36  C) (Axillary)   Resp 18   Wt 74.4 kg (164 lb)   SpO2 96%   BMI 22.24 kg/m     Alert,well orientated.Up ad terence.Afebrile overnoc.Drinking/eating well.PIV line R AC,saline locked.R side lower abdomen discomfort,tylenol given.Reports had BM yesterday.Denies nausea.Voiding spontaneously.Calm,cooperative overnoc.Pt agreed with plan on lab recheck this morning.Bld culture result pending.

## 2019-09-05 NOTE — DISCHARGE SUMMARY
"Admit Date:     09/03/2019   Discharge Date: 09/05/2019       DATE OF DISCHARGE:  09/05/2019      FINAL DIAGNOSES:   1.  Complicated Escherichia coli urinary tract infection with right-sided pyelonephritis; sepsis ruled out  Clinically improved.   2.  Blood pressure elevation, which has been chronic per patient.  Potential for underlying essential hypertension.  Planned out patient follow up.   3.  Hyponatremia on presentation, possibly related to hypovolemia.  Resolved with intravenous fluid administration.   4.  Alcohol use disorder, continuous.  Last drink 08/30/2019.  No features of withdrawal.  No history of delirium tremens or withdrawal-related seizures.  Declined Chemical Dependency intervention, with a \"plan to quit.\"   5.  Tachycardia 2nd to #1 and volume depletion.  Improved (Indication of baseline increase in HR).      HISTORY OF PRESENT ILLNESS:  Pleasant 28-year-old male admitted to the Medical Service through the Emergency Department, where he presented with constellation of persistent fever, right-sided flank and suprapubic discomfort, dysuria and urinary frequency in the setting of recently documented E. coli urinary tract infection.      Usual state of health until approximately 5 days prior to admission, when patient developed urinary hesitancy with associated gross hematuria followed by urinary frequency and dysuria.  Urgent Care evaluation on 09/02/2019, where urinalysis demonstrated active urinary sediment with  white cells, negative nitrite, moderate blood, and 2-5 red blood cells.  Placed on Cipro 500 mg b.i.d. pending return of urine culture.  Compliant taking 2 doses, despite which the patient continued to feel poorly with right flank/back pain, suprapubic discomfort and persistent voiding complaints.  Headache.  Presentation to the Emergency Department, where a fever of 101.2 documented.  Tachycardic with heart rate of 138.  Blood pressure elevation 164/103.  Normal respirations with " O2 saturation 97% on room air.  Repeat urinalysis demonstrated a fairly bland specimen with trace blood, small leukocyte esterase screen, and only 11 white blood cells.  White count elevated at 18,300 with hemoglobin 14.5, platelet count 283,000.  Low sodium of 131 with potassium of 3.6, chloride of 93, CO2 of 30, BUN of 7, creatinine 1.14, anion gap of 8.  CT scan of the abdomen and pelvis demonstrated mild right perirenal stranding with some enhancement of the right renal pelvis walls.  No hydronephrosis or urolithiasis.  Mild wall prominence of the urinary bladder.  Incidental left pelvic kidney.  No other acute abnormality.      Clinical presentation and CT findings were felt to be consistent with acute right-sided pyelonephritis.  Two liters of IV fluid in the Emergency Department.  IV Rocephin.  Admission to the Medical Unit.      HOSPITAL COURSE:  The patient remained hemodynamically stable except for persistent blood pressure elevation and tachycardia.  By the morning of 09/04/2019, patient noted significant clinical improvement.  No chills.  Defervescence.  Resolution of back/flank and suprapubic discomfort.  No lingering dysuria.  Improved urinary stream.  Up in room without postural lightheadedness.  Headache discomfort prior to admission likely related to fever completely resolved.  No cardiopulmonary complaints, nausea, lingering abdominal discomfort.  Soft bowel movement..      No prior history of urinary tract infection.  PVR 63 mL, demonstrating fairly adequate bladder emptying.  No known calculus disease.  Rectal exam was performed, demonstrating a nontender prostate.  Case was discussed with Urology, who did not recommend further urologic workup at this time.      Additional clinical concerns including that of regular alcohol intake.  The patient consuming up to 6 beers daily.  Last drink on 08/30/2019.  No stigmata of withdrawal during the hospitalization except for the noted tachycardia.  No  prior history of withdrawal, DTs, withdrawal-related seizures, pancreatitis or alcohol-related liver disease.  The patient declined CD intervention.  Indication of his definitive plan to quit.      Persistent blood pressure elevation throughout patient's hospital course with readings in the 140s-150s/90s-100s.  Heart rate in the 90s-120s range.  History from patient of chronic blood pressure elevation without consistent followup or treatment intervention.  Record review indicated that at time of preop H&P in 09/2018, blood pressure was mildly elevated at 140/82 with a heart rate in the upper 80s.  TSH normal 1.55.  With persistent elevation, elected to start patient on lopressor 12.5 mg BID  to blunt what patient indicated was a tendency toward a rapid heart rate with anxiety/stressful situations.     As of the afternoon of 09/04/2019, patient continued to feel well clinically.  He did, however, develop a recurrent fever spike to 101.2.  No associated chills, recurrent headache, flank or abdominal discomfort.  Ambulatory without postural dizziness.  No nausea.  Tolerating p.o. intake/fluids.  Exam demonstrated persistent blood pressure elevation at 140-152/103 with a heart rate of 105.  Respirations nonlabored, O2 saturation 98% room air.  No CVA or flank tenderness with percussion.  Abdominal exam benign.  Distal lower extremities well perfused.  Triggered sepsis protocol with mildly elevated lactate that normalized with IV fluids.     With the recurrent fever spike on 9/4, it was recommended to the patient that he remain in the hospital for continued IV antibiotics, which he initially declined, but then agreed.  Continued IV ceftriaxone.  By 9/5 patient continued to do well except for a mild full sensation in the right upper abdomen.  Non tender abdominal exam.  No flank tenderness with percussion.   Followup laboratory data 9/4-9/5 demonstrated a normal basic metabolic profile with normal electrolytes, a BUN of  6, creatinine 0.92.  Normalization of white count at 10,800 with hemoglobin 13.1, platelet count 256,000.  Hyponatremia on presentation, thought potentially related to hypovolemia normalizing with IV fluid replacement.  Drop in CRP from 180 to 123 thought to support clinical improvement despite rise in WBC the morning of  to 12.1 the significance of which was unclear. Patient indicated need to discharge home   to assist with .  Clinically appeared stable.  E Coli pan sensitive. Reviewed with ID.  Anticipated patient  should do well with repeat dose rocephin IV before departure with out patient completion of cipro 500 mg BID.       Discharge Instructions:     Keep primary MD appointment as scheduled  Return to Emergency department or call MD if recurrent back or abdominal pain, nausea, fever, urinary symptoms No alcohol. Push fluids. Start lopressor tonight and cipro in AM .    Discharge Meds:     CIPRO 500 MG BID FOR 6 DAYS  LOPRESSOR 12.5 MG BID  LACTOBACILLUS 1 TAB BID    CONDITION AT DISCHARGE STABLE                  ALEXANDRIA ROSARIO MD             D: 2019   T: 2019   MT: SEEMA      Name:     PAUL LOVETT   MRN:      [id number]        Account:        [id number]   :      1990           Admit Date:     2019                                  Discharge Date:       Document: [id number]

## 2019-09-05 NOTE — PROGRESS NOTES
[facility] Internal Medicine Progress Note            Interval History:   Record reviewed.   Seen earlier with RN. Decision to remain in hospital over night.   Continued rocephin 1 gram IV q 24 hrs.  Vague pressure sensation right mid abdomen.  Otherwise feeling well.  HA last night, resolved.  No chills, sweats, CP, SOB, cough, nausea, reflux.  Soft BM last night.  No dysuria.  Triggered sepsis protocol earlier with lactic acid 2.3 normalizing to 1.7 after LR bolus.           Medications:   All medications reviewed today          Physical Exam:   Blood pressure (!) 146/99, pulse 108, temperature 98  F (36.7  C), temperature source Oral, resp. rate 18, weight 74.4 kg (164 lb), SpO2 99 %.  No intake or output data in the 24 hours ending 09/04/19 1328    General:  Alert.  Appropriate.  No distress.  No O2.  -150 systolic.     Heent:      Neck:    Skin:    Chest:  clear    Cardiac:  Reg without gallop, murmur.  No JVD.     Abdomen:  Non distended, soft, non-tender.  BS normal.   No flank, CVA tenderness with percussion.     Extremities:  Perfused.  No edema, calf, posterior thigh tenderness to suggest DVT.      Neuro:            Data:     Results for orders placed or performed during the hospital encounter of 09/03/19 (from the past 24 hour(s))   WBC count   Result Value Ref Range    WBC 10.6 4.0 - 11.0 10e9/L   Basic metabolic panel   Result Value Ref Range    Sodium 137 133 - 144 mmol/L    Potassium 4.0 3.4 - 5.3 mmol/L    Chloride 102 94 - 109 mmol/L    Carbon Dioxide 27 20 - 32 mmol/L    Anion Gap 8 3 - 14 mmol/L    Glucose 94 70 - 99 mg/dL    Urea Nitrogen 8 7 - 30 mg/dL    Creatinine 0.87 0.66 - 1.25 mg/dL    GFR Estimate >90 >60 mL/min/[1.73_m2]    GFR Estimate If Black >90 >60 mL/min/[1.73_m2]    Calcium 8.9 8.5 - 10.1 mg/dL   CBC with platelets   Result Value Ref Range    WBC 12.1 (H) 4.0 - 11.0 10e9/L    RBC Count 4.17 (L) 4.4 - 5.9 10e12/L    Hemoglobin 13.4 13.3 - 17.7 g/dL    Hematocrit 40.9  "40.0 - 53.0 %    MCV 98 78 - 100 fl    MCH 32.1 26.5 - 33.0 pg    MCHC 32.8 31.5 - 36.5 g/dL    RDW 12.5 10.0 - 15.0 %    Platelet Count 292 150 - 450 10e9/L   CRP inflammation   Result Value Ref Range    CRP Inflammation 123.0 (H) 0.0 - 8.0 mg/L   Lactic acid level STAT for sepsis protocol   Result Value Ref Range    Lactate for Sepsis Protocol 2.3 (H) 0.7 - 2.0 mmol/L   Lactic acid whole blood   Result Value Ref Range    Lactic Acid 1.7 0.7 - 2.0 mmol/L              Assessment and Plan:   1)  Complicated UTI with right pyelonephritis.  Clinically doing well.  E. Coli on UC 9/2.  Sensitivities pending.  No known calculus disease unless passed stone.  No clinical suggestion of prostatitis.  PVR 60s.   Reviewed with urology - recommend treat UTI without further intervention presently.  Recurrent fever spike not unexpected in terms of anticipated course as long as downward trend.  Interval rise WBC unclear significance.  Decrease CRP reassuring.  2)  BP elevation c/w possible essential HTN.  On lopressor.   3)  Hyponatremia potential relation to decrease volume, resolved.  4)  Alcohol use disorder - 6 beer/day - last drink 8/30 - no features of WD (no h/o WD, DTs, seizures).  Declines CD intervention with \"plan to quit\".    PLAN:  1)  Patient indicates need to discharge home today to assist with .  Clinically appears stable presently.  E Coli pan sensitive.  Anticipate should do well with repeat dose rocephin IV today with out patient completion of cipro.   2)  Primary MD appointment next week as scheduled 9/11 Return to Emergency department or call MD if recurrent back or abdominal pain, nausea, fever, urinary symptoms No alcohol. Push fluids. Start lopressor tonight and cipro in AM 9/6    Disposition Plan   Expected discharge today to prior living arrangement     Entered: Jose De Jesus Renee 09/05/2019, 1:58 PM              Attestation:  I have reviewed today's vital signs, notes, medications, labs and " imaging.     Jose De Jesus Renee MD

## 2019-09-09 LAB
BACTERIA SPEC CULT: NO GROWTH
BACTERIA SPEC CULT: NO GROWTH
Lab: NORMAL
Lab: NORMAL
SPECIMEN SOURCE: NORMAL
SPECIMEN SOURCE: NORMAL

## 2019-09-11 ENCOUNTER — OFFICE VISIT (OUTPATIENT)
Dept: FAMILY MEDICINE | Facility: CLINIC | Age: 29
End: 2019-09-11
Payer: COMMERCIAL

## 2019-09-11 VITALS
TEMPERATURE: 97.9 F | DIASTOLIC BLOOD PRESSURE: 78 MMHG | SYSTOLIC BLOOD PRESSURE: 128 MMHG | BODY MASS INDEX: 22.24 KG/M2 | WEIGHT: 164 LBS | HEART RATE: 91 BPM | OXYGEN SATURATION: 99 %

## 2019-09-11 DIAGNOSIS — F17.200 TOBACCO USE DISORDER: ICD-10-CM

## 2019-09-11 DIAGNOSIS — Z86.19 HISTORY OF SEPSIS: ICD-10-CM

## 2019-09-11 DIAGNOSIS — Z09 HOSPITAL DISCHARGE FOLLOW-UP: Primary | ICD-10-CM

## 2019-09-11 DIAGNOSIS — Z23 NEED FOR PROPHYLACTIC VACCINATION AND INOCULATION AGAINST INFLUENZA: ICD-10-CM

## 2019-09-11 PROCEDURE — 99213 OFFICE O/P EST LOW 20 MIN: CPT | Performed by: FAMILY MEDICINE

## 2019-09-11 RX ORDER — AMLODIPINE BESYLATE 2.5 MG/1
TABLET ORAL
Refills: 0 | COMMUNITY
Start: 2019-09-04 | End: 2020-12-23

## 2019-09-11 RX ORDER — TAMSULOSIN HYDROCHLORIDE 0.4 MG/1
CAPSULE ORAL
Refills: 0 | COMMUNITY
Start: 2019-09-02 | End: 2019-09-11

## 2019-09-11 NOTE — PROGRESS NOTES
"Subjective     Cheko Guidry is a 28 year old male who presents to clinic today for the following health issues:    HPI     Hospital Follow-up Visit:    Hospital/Nursing Home/IP Rehab Facility: [facility]  Date of Admission: 9/3/19  Date of Discharge: 9/5/19  Reason(s) for Admission: Complicated Escherichia coli urinary tract infection with right-sided pyelonephritis.  Clinically improved.            Problems taking medications regularly:  None       Medication changes since discharge: None       Problems adhering to non-medication therapy:  None    Summary of hospitalization reviewed:  Admit Date:     09/03/2019   Discharge Date: 09/05/2019       DATE OF DISCHARGE:  09/05/2019      FINAL DIAGNOSES:   1.  Complicated Escherichia coli urinary tract infection with right-sided pyelonephritis.  Clinically improved.   2.  Blood pressure elevation, which has been chronic per patient.  Potential for underlying essential hypertension.  Planned out patient follow up.   3.  Hyponatremia on presentation, possibly related to hypovolemia.  Resolved with intravenous fluid administration.   4.  Alcohol use disorder, continuous.  Last drink 08/30/2019.  No features of withdrawal.  No history of delirium tremens or withdrawal-related seizures.  Declined Chemical Dependency intervention, with a \"plan to quit.\"   5.  Tachycardia 2nd to #1 and volume depletion.  Improved (Indication of baseline increase in HR).     Diagnostic Tests/Treatments reviewed.  Follow up needed: none  Other Healthcare Providers Involved in Patient s Care:         None  Update since discharge: improved.     Post Discharge Medication Reconciliation: discharge medications reconciled, continue medications without change.  Plan of care communicated with patient     Coding guidelines for this visit:  Type of Medical   Decision Making Face-to-Face Visit       within 7 Days of discharge Face-to-Face Visit        within 14 days of discharge "   Moderate Complexity [id number]   High Complexity [id number]          Feeling better   Denies any back or abdominal pain, nausea, fever, urinary symptoms       Patient Active Problem List   Diagnosis     Elevated blood pressure reading without diagnosis of hypertension     Tobacco abuse     Sepsis (H)     Past Surgical History:   Procedure Laterality Date     EXPLORE SCROTUM  9/17/2018    Procedure: EXPLORE SCROTUM;;  Surgeon: Russell Munoz MD;  Location: [facility] OR     NO HISTORY OF SURGERY       VASECTOMY N/A 9/17/2018    Procedure: VASECTOMY;  Vasectomy, and scrotal exploration.;  Surgeon: Russell Munoz MD;  Location:  OR       Social History     Tobacco Use     Smoking status: Current Every Day Smoker     Packs/day: 0.25     Smokeless tobacco: Never Used   Substance Use Topics     Alcohol use: Yes     Comment: Occassionally.      Family History   Problem Relation Age of Onset     Heart Disease No family hx of      Cancer No family hx of      Diabetes No family hx of          PROBLEMS TO ADD ON...  Reviewed and updated as needed this visit by Provider    Review of Systems   Constitutional, HEENT, cardiovascular, pulmonary, gi and gu systems are negative, except as otherwise noted.      Objective    /78   Pulse 91   Temp 97.9  F (36.6  C) (Oral)   Wt 74.4 kg (164 lb)   SpO2 99%   BMI 22.24 kg/m    Body mass index is 22.24 kg/m .  Physical Exam   GENERAL: healthy, alert and no distress  NECK: no adenopathy, no asymmetry, masses, or scars and thyroid normal to palpation  RESP: lungs clear to auscultation - no rales, rhonchi or wheezes  CV: regular rate and rhythm, normal S1 S2, no S3 or S4, no murmur, click or rub, no peripheral edema and peripheral pulses strong  ABDOMEN: soft, nontender, no hepatosplenomegaly, no masses and bowel sounds normal  MS: no gross musculoskeletal defects noted, no edema  NEURO: Normal strength and tone, mentation intact and speech normal  PSYCH: mentation appears  normal, affect normal/bright    Diagnostic Test Results:  Labs reviewed in Epic        Assessment & Plan     Cheko was seen today for hospital f/u and imm/inj.    Diagnoses and all orders for this visit:    Hospital discharge follow-up    History of sepsis     -    Symptoms resolved. Labs were stable at discharge    Tobacco use disorder      -      Discussed risks of smoking and strongly advised smoking cessation.  Went over various options including patches, meds, and cessation programs available; will weight the options.    Need for prophylactic vaccination and inoculation against influenza  -    HC FLU VAC PRESRV FREE QUAD SPLIT VIR > 6 MONTHS IM [10787]  -     Vaccine Administration, Initial [25411]    Tobacco Cessation:   reports that he has been smoking.  He has been smoking about 0.25 packs per day. He has never used smokeless tobacco.    Return in about 3 months (around 12/11/2019) for Physical Exam.    Marlon More MD  [facility]

## 2019-11-04 ENCOUNTER — HEALTH MAINTENANCE LETTER (OUTPATIENT)
Age: 29
End: 2019-11-04

## 2020-11-22 ENCOUNTER — HEALTH MAINTENANCE LETTER (OUTPATIENT)
Age: 30
End: 2020-11-22

## 2020-12-23 ENCOUNTER — VIRTUAL VISIT (OUTPATIENT)
Dept: FAMILY MEDICINE | Facility: CLINIC | Age: 30
End: 2020-12-23
Payer: COMMERCIAL

## 2020-12-23 DIAGNOSIS — R30.0 DYSURIA: Primary | ICD-10-CM

## 2020-12-23 DIAGNOSIS — R03.0 ELEVATED BLOOD PRESSURE READING WITHOUT DIAGNOSIS OF HYPERTENSION: ICD-10-CM

## 2020-12-23 PROCEDURE — 99213 OFFICE O/P EST LOW 20 MIN: CPT | Mod: 95 | Performed by: PHYSICIAN ASSISTANT

## 2020-12-23 RX ORDER — METOPROLOL TARTRATE 25 MG/1
12.5 TABLET, FILM COATED ORAL 2 TIMES DAILY
Qty: 60 TABLET | Refills: 0 | Status: SHIPPED | OUTPATIENT
Start: 2020-12-23

## 2020-12-23 RX ORDER — AMLODIPINE BESYLATE 2.5 MG/1
2.5 TABLET ORAL DAILY
Qty: 30 TABLET | Refills: 0 | Status: SHIPPED | OUTPATIENT
Start: 2020-12-23

## 2020-12-23 NOTE — PATIENT INSTRUCTIONS
If worsening be seen in urgent care this weekend.  If not improving follow up next week.  Otherwise follow up for blood pressure in the next 2-3 weeks.

## 2020-12-23 NOTE — PROGRESS NOTES
"Cheko Guidry is a 30 year old male who is being evaluated via a billable telephone visit.      The patient has been notified of following:     \"This telephone visit will be conducted via a call between you and your physician/provider. We have found that certain health care needs can be provided without the need for a physical exam.  This service lets us provide the care you need with a short phone conversation.  If a prescription is necessary we can send it directly to your pharmacy.  If lab work is needed we can place an order for that and you can then stop by our lab to have the test done at a later time.    Telephone visits are billed at different rates depending on your insurance coverage. During this emergency period, for some insurers they may be billed the same as an in-person visit.  Please reach out to your insurance provider with any questions.    If during the course of the call the physician/provider feels a telephone visit is not appropriate, you will not be charged for this service.\"    Patient has given verbal consent for Telephone visit?  Yes    What phone number would you like to be contacted at? [phone]     How would you like to obtain your AVS? Noe Dangelo     Cheko Guidry is a 30 year old male who presents via phone visit today for the following health issues:    HPI     Genitourinary - Male  Onset/Duration: since 12-21-20,symptoms are improving   Description:   Dysuria (painful urination): YES just in a morning   Hematuria (blood in urine): no  Frequency: no  Waking at night to urinate: no  Hesitancy (delay in urine): no  Retention (unable to empty): no  Decrease in urinary flow: no  Incontinence: no  Progression of Symptoms:  improving  Accompanying Signs & Symptoms:  Fever: no  Back/Flank pain: YES R side 2 days ago   Had a headache too the first day but now gone.    No radiation of pain  Had a kidney infection a year ago but had worse pian and fever and hematuria.    Urethral " discharge: no  Testicle lumps/masses/pain: no  Nausea and/or vomiting: no  Abdominal pain: no  History:   History of frequent UTI s: no   History of kidney stones: YES over 1 year ago   History of hernias: no  Personal or Family history of Prostate problems: no  Sexually active: YES  No concern for sexually transmitted disease.    Precipitating or alleviating factors: None  Therapies tried and outcome: none      Has run out of blood pressure medication for over a year.            Review of Systems   As above       Objective          Vitals:  No vitals were obtained today due to virtual visit.    healthy, alert and no distress  PSYCH: Alert and oriented times 3; coherent speech, normal   rate and volume, able to articulate logical thoughts, able   to abstract reason, no tangential thoughts, no hallucinations   or delusions  His affect is normal  RESP: No cough, no audible wheezing, able to talk in full sentences  Remainder of exam unable to be completed due to telephone visits            Assessment/Plan:    Assessment & Plan     Elevated blood pressure reading without diagnosis of hypertension  Restart and follow up in 2-3 weeks  - amLODIPine (NORVASC) 2.5 MG tablet; Take 1 tablet (2.5 mg) by mouth daily  - metoprolol tartrate (LOPRESSOR) 25 MG tablet; Take 0.5 tablets (12.5 mg) by mouth 2 times daily    Dysuria  Improving.  Push fluids.  See patient instructions        Tobacco Cessation:   reports that he has been smoking. He has been smoking about 0.25 packs per day. He has never used smokeless tobacco.               No follow-ups on file.    Melissa Miranda PA-C  [facility]    Phone call duration:  2:16-2:22   6 minutes

## 2021-09-19 ENCOUNTER — HEALTH MAINTENANCE LETTER (OUTPATIENT)
Age: 31
End: 2021-09-19

## 2021-09-28 NOTE — ANESTHESIA POSTPROCEDURE EVALUATION
Bedside and Verbal shift change report given to [address] [place] [address] Road (oncoming nurse) by [place] MEZA (offgoing nurse). Report included the following information SBAR, Kardex, Intake/Output, MAR, Recent Results, Cardiac Rhythm NSR and Dual Neuro Assessment. Patient: Cheko Guidry    Procedure(s):  Vasectomy, and scrotal exploration. - Wound Class: I-Clean   - Wound Class: II-Clean Contaminated    Diagnosis:Request sterilization  Diagnosis Additional Information: No value filed.    Anesthesia Type:  MAC    Note:  Anesthesia Post Evaluation    Patient location during evaluation: Phase 2  Patient participation: Able to fully participate in evaluation  Level of consciousness: awake and alert  Pain management: adequate  Airway patency: patent  Cardiovascular status: acceptable  Respiratory status: acceptable  Hydration status: acceptable  PONV: none     Anesthetic complications: None          Last vitals:  Vitals:    09/17/18 1130 09/17/18 1230 09/17/18 1245   BP: (!) 167/104 (!) 153/99 (!) 152/95   Resp:  16 16   Temp: 98.1  F (36.7  C) 98.8  F (37.1  C)    SpO2: 99% 98% 99%         Electronically Signed By: Ji Dinh MD  September 17, 2018  12:57 PM

## 2022-01-08 ENCOUNTER — HEALTH MAINTENANCE LETTER (OUTPATIENT)
Age: 32
End: 2022-01-08

## 2022-11-20 ENCOUNTER — HEALTH MAINTENANCE LETTER (OUTPATIENT)
Age: 32
End: 2022-11-20

## 2023-04-15 ENCOUNTER — HEALTH MAINTENANCE LETTER (OUTPATIENT)
Age: 33
End: 2023-04-15

## (undated) DEVICE — SU CHROMIC 3-0 FS-2 27" 636

## (undated) DEVICE — SYR EAR BULB 3OZ 0035830

## (undated) DEVICE — SU SILK 2-0 TIE 24" SA75H

## (undated) DEVICE — GLOVE PROTEXIS W/NEU-THERA 7.0  2D73TE70

## (undated) DEVICE — SOL WATER IRRIG 1000ML BOTTLE 07139-09

## (undated) DEVICE — PACK MINOR SBA15MIFSE

## (undated) DEVICE — PREP SKIN SCRUB TRAY 4461A

## (undated) DEVICE — SUPPORTER ATHLETIC LG CLINITEX LF 67-1006

## (undated) RX ORDER — LIDOCAINE HYDROCHLORIDE 20 MG/ML
INJECTION, SOLUTION EPIDURAL; INFILTRATION; INTRACAUDAL; PERINEURAL
Status: DISPENSED
Start: 2018-09-17

## (undated) RX ORDER — PROPOFOL 10 MG/ML
INJECTION, EMULSION INTRAVENOUS
Status: DISPENSED
Start: 2018-09-17

## (undated) RX ORDER — GABAPENTIN 300 MG/1
CAPSULE ORAL
Status: DISPENSED
Start: 2018-09-17

## (undated) RX ORDER — GINSENG 100 MG
CAPSULE ORAL
Status: DISPENSED
Start: 2018-09-17

## (undated) RX ORDER — OXYCODONE HYDROCHLORIDE 5 MG/1
TABLET ORAL
Status: DISPENSED
Start: 2018-09-17

## (undated) RX ORDER — FENTANYL CITRATE 50 UG/ML
INJECTION, SOLUTION INTRAMUSCULAR; INTRAVENOUS
Status: DISPENSED
Start: 2018-09-17

## (undated) RX ORDER — CEFAZOLIN SODIUM 2 G/100ML
INJECTION, SOLUTION INTRAVENOUS
Status: DISPENSED
Start: 2018-09-17

## (undated) RX ORDER — ACETAMINOPHEN 325 MG/1
TABLET ORAL
Status: DISPENSED
Start: 2018-09-17